# Patient Record
Sex: MALE | Race: WHITE | NOT HISPANIC OR LATINO
[De-identification: names, ages, dates, MRNs, and addresses within clinical notes are randomized per-mention and may not be internally consistent; named-entity substitution may affect disease eponyms.]

---

## 2017-04-17 ENCOUNTER — RX RENEWAL (OUTPATIENT)
Age: 57
End: 2017-04-17

## 2017-06-26 ENCOUNTER — RX RENEWAL (OUTPATIENT)
Age: 57
End: 2017-06-26

## 2017-08-07 ENCOUNTER — RX RENEWAL (OUTPATIENT)
Age: 57
End: 2017-08-07

## 2017-11-01 ENCOUNTER — RX RENEWAL (OUTPATIENT)
Age: 57
End: 2017-11-01

## 2018-01-31 ENCOUNTER — RX RENEWAL (OUTPATIENT)
Age: 58
End: 2018-01-31

## 2018-06-10 ENCOUNTER — RX RENEWAL (OUTPATIENT)
Age: 58
End: 2018-06-10

## 2018-09-07 ENCOUNTER — RX RENEWAL (OUTPATIENT)
Age: 58
End: 2018-09-07

## 2018-12-09 ENCOUNTER — RX RENEWAL (OUTPATIENT)
Age: 58
End: 2018-12-09

## 2019-01-05 ENCOUNTER — APPOINTMENT (OUTPATIENT)
Dept: ULTRASOUND IMAGING | Facility: HOSPITAL | Age: 59
End: 2019-01-05
Payer: COMMERCIAL

## 2019-01-05 ENCOUNTER — OUTPATIENT (OUTPATIENT)
Dept: OUTPATIENT SERVICES | Facility: HOSPITAL | Age: 59
LOS: 1 days | End: 2019-01-05
Payer: COMMERCIAL

## 2019-01-05 DIAGNOSIS — Z00.8 ENCOUNTER FOR OTHER GENERAL EXAMINATION: ICD-10-CM

## 2019-01-05 PROCEDURE — 76700 US EXAM ABDOM COMPLETE: CPT | Mod: 26

## 2019-01-05 PROCEDURE — 76700 US EXAM ABDOM COMPLETE: CPT

## 2019-03-10 ENCOUNTER — RX RENEWAL (OUTPATIENT)
Age: 59
End: 2019-03-10

## 2019-06-10 ENCOUNTER — RX RENEWAL (OUTPATIENT)
Age: 59
End: 2019-06-10

## 2019-09-13 ENCOUNTER — RX RENEWAL (OUTPATIENT)
Age: 59
End: 2019-09-13

## 2019-11-16 ENCOUNTER — EMERGENCY (EMERGENCY)
Facility: HOSPITAL | Age: 59
LOS: 1 days | Discharge: HOME | End: 2019-11-16
Attending: EMERGENCY MEDICINE | Admitting: EMERGENCY MEDICINE
Payer: COMMERCIAL

## 2019-11-16 VITALS — SYSTOLIC BLOOD PRESSURE: 158 MMHG | DIASTOLIC BLOOD PRESSURE: 94 MMHG | HEART RATE: 66 BPM

## 2019-11-16 VITALS
SYSTOLIC BLOOD PRESSURE: 182 MMHG | DIASTOLIC BLOOD PRESSURE: 119 MMHG | RESPIRATION RATE: 20 BRPM | HEIGHT: 69 IN | HEART RATE: 87 BPM | TEMPERATURE: 98 F | OXYGEN SATURATION: 98 % | WEIGHT: 171.96 LBS

## 2019-11-16 DIAGNOSIS — R00.2 PALPITATIONS: ICD-10-CM

## 2019-11-16 LAB
ANION GAP SERPL CALC-SCNC: 8 MMOL/L — SIGNIFICANT CHANGE UP (ref 5–17)
BUN SERPL-MCNC: 22 MG/DL — SIGNIFICANT CHANGE UP (ref 7–23)
CALCIUM SERPL-MCNC: 8.6 MG/DL — SIGNIFICANT CHANGE UP (ref 8.4–10.5)
CHLORIDE SERPL-SCNC: 103 MMOL/L — SIGNIFICANT CHANGE UP (ref 96–108)
CO2 SERPL-SCNC: 29 MMOL/L — SIGNIFICANT CHANGE UP (ref 22–31)
CREAT SERPL-MCNC: 1.29 MG/DL — SIGNIFICANT CHANGE UP (ref 0.5–1.3)
GLUCOSE SERPL-MCNC: 103 MG/DL — HIGH (ref 70–99)
HCT VFR BLD CALC: 45 % — SIGNIFICANT CHANGE UP (ref 39–50)
HGB BLD-MCNC: 15.2 G/DL — SIGNIFICANT CHANGE UP (ref 13–17)
MAGNESIUM SERPL-MCNC: 1.9 MG/DL — SIGNIFICANT CHANGE UP (ref 1.6–2.6)
MCHC RBC-ENTMCNC: 28.1 PG — SIGNIFICANT CHANGE UP (ref 27–34)
MCHC RBC-ENTMCNC: 33.8 GM/DL — SIGNIFICANT CHANGE UP (ref 32–36)
MCV RBC AUTO: 83.3 FL — SIGNIFICANT CHANGE UP (ref 80–100)
NRBC # BLD: 0 /100 WBCS — SIGNIFICANT CHANGE UP (ref 0–0)
PLATELET # BLD AUTO: 179 K/UL — SIGNIFICANT CHANGE UP (ref 150–400)
POTASSIUM SERPL-MCNC: 3.4 MMOL/L — LOW (ref 3.5–5.3)
POTASSIUM SERPL-SCNC: 3.4 MMOL/L — LOW (ref 3.5–5.3)
RBC # BLD: 5.4 M/UL — SIGNIFICANT CHANGE UP (ref 4.2–5.8)
RBC # FLD: 13.3 % — SIGNIFICANT CHANGE UP (ref 10.3–14.5)
SODIUM SERPL-SCNC: 140 MMOL/L — SIGNIFICANT CHANGE UP (ref 135–145)
WBC # BLD: 6.12 K/UL — SIGNIFICANT CHANGE UP (ref 3.8–10.5)
WBC # FLD AUTO: 6.12 K/UL — SIGNIFICANT CHANGE UP (ref 3.8–10.5)

## 2019-11-16 PROCEDURE — 99284 EMERGENCY DEPT VISIT MOD MDM: CPT

## 2019-11-16 PROCEDURE — 99284 EMERGENCY DEPT VISIT MOD MDM: CPT | Mod: 25

## 2019-11-16 PROCEDURE — 80048 BASIC METABOLIC PNL TOTAL CA: CPT

## 2019-11-16 PROCEDURE — 96374 THER/PROPH/DIAG INJ IV PUSH: CPT

## 2019-11-16 PROCEDURE — 83735 ASSAY OF MAGNESIUM: CPT

## 2019-11-16 PROCEDURE — 85027 COMPLETE CBC AUTOMATED: CPT

## 2019-11-16 PROCEDURE — 36415 COLL VENOUS BLD VENIPUNCTURE: CPT

## 2019-11-16 PROCEDURE — 99053 MED SERV 10PM-8AM 24 HR FAC: CPT

## 2019-11-16 PROCEDURE — 93005 ELECTROCARDIOGRAM TRACING: CPT

## 2019-11-16 PROCEDURE — 93010 ELECTROCARDIOGRAM REPORT: CPT

## 2019-11-16 RX ORDER — POTASSIUM CHLORIDE 20 MEQ
40 PACKET (EA) ORAL ONCE
Refills: 0 | Status: COMPLETED | OUTPATIENT
Start: 2019-11-16 | End: 2019-11-16

## 2019-11-16 RX ORDER — METOPROLOL TARTRATE 50 MG
5 TABLET ORAL ONCE
Refills: 0 | Status: COMPLETED | OUTPATIENT
Start: 2019-11-16 | End: 2019-11-16

## 2019-11-16 RX ADMIN — Medication 5 MILLIGRAM(S): at 01:49

## 2019-11-16 RX ADMIN — Medication 40 MILLIEQUIVALENT(S): at 02:56

## 2019-11-16 NOTE — ED PROVIDER NOTE - NSFOLLOWUPINSTRUCTIONS_ED_ALL_ED_FT
Premature Ventricular Contractions    WHAT YOU NEED TO KNOW:    Premature ventricular contractions (PVCs) are an interruption in your heart rhythm. They are caused by an early signal for your heart to pump. Your risk of PVCs increases when you drink alcohol or caffeine, or if you are fatigued or stressed. It is very important for you to follow up with your healthcare provider so the cause of your PVCs can be diagnosed and treated.    DISCHARGE INSTRUCTIONS:    Call 911 if:     You have any of the following signs of a heart attack:   Squeezing, pressure, or pain in your chest      You may also have any of the following:   Discomfort or pain in your back, neck, jaw, stomach, or arm      Shortness of breath      Nausea or vomiting      Lightheadedness or a sudden cold sweat        Contact your healthcare provider if:     You still have symptoms after treatment, or your symptoms worsen.      You have questions or concerns about your condition or care.    Medicines:     Heart medicine may be given to make your heart beat at a regular rate and rhythm.      Take your medicine as directed. Contact your healthcare provider if you think your medicine is not helping or if you have side effects. Tell him or her if you are allergic to any medicine. Keep a list of the medicines, vitamins, and herbs you take. Include the amounts, and when and why you take them. Bring the list or the pill bottles to follow-up visits. Carry your medicine list with you in case of an emergency.    Follow up with your healthcare provider as directed: You may need another EKG within the first 10 days and more testing for up to 12 months. Write down your questions so you remember to ask them during your visits.    Hypokalemia    WHAT YOU NEED TO KNOW:    Hypokalemia is a low level of potassium in your blood. Potassium helps control how your muscles, heart, and digestive system work. Hypokalemia occurs when your body loses too much potassium or does not absorb enough from food.     DISCHARGE INSTRUCTIONS:    Return to the emergency department if:     You cannot move your arm or leg.      You have a fast or irregular heartbeat.      You are too tired or weak to stand up.    Contact your healthcare provider if:     You are vomiting, or you have diarrhea.      You have numbness or tingling in your arms or legs.      Your symptoms do not go away or they get worse.      You have questions or concerns about your condition or care.    Medicines:     Potassium will be given to bring your potassium levels back to normal.      Take your medicine as directed. Contact your healthcare provider if you think your medicine is not helping or if you have side effects. Tell him of her if you are allergic to any medicine. Keep a list of the medicines, vitamins, and herbs you take. Include the amounts, and when and why you take them. Bring the list or the pill bottles to follow-up visits. Carry your medicine list with you in case of an emergency.    Eat foods that are high in potassium: Foods that are high in potassium include bananas, oranges, tomatoes, potatoes, and avocado. Collazo beans, turkey, salmon, lean beef, yogurt, and milk are also high in potassium. Ask your healthcare provider or dietitian for more information about foods that are high in potassium.     Follow up with your healthcare provider as directed: Write down your questions so you remember to ask them during your visits.

## 2019-11-16 NOTE — ED PROVIDER NOTE - CLINICAL SUMMARY MEDICAL DECISION MAKING FREE TEXT BOX
palpitation. likely PVC.  Pt felt palpitation during exam, which correlated with PVC seen on monitor.   no chest pain or other sxs suggestive of acs.  BP at triage high.  will recheck, likely temporary/ anxiety related. r/o arrrhymia, electrolyte abnormality, anemia. check labs, tele monitor in ED. repeat ekg. palpitation. likely PVC.  Pt felt palpitation during exam, which correlated with PVC seen on monitor.   no chest pain or other sxs suggestive of acs.  BP at triage high.  will recheck, likely temporary/ anxiety related. r/o arrrhymia, electrolyte abnormality, anemia. check labs, tele monitor in ED. repeat ekg.    -repeat ekg no pvc, otherwise no change. labs normal except hypokalemia 3.4, which was supplemented PO. lopressor 5mg iv given for elevated bp and pvc . bp improved. pt feeling well. f/u c cardiologist

## 2019-11-16 NOTE — ED PROVIDER NOTE - OBJECTIVE STATEMENT
pt c/o palpitations , moderate, intermittent since 11am today. pt feels extra beat , lasts for a second. no assoc sxs. no chest pain, sob, dizziness, n/v. no fever. no recent illness. no illegal drug use.

## 2019-11-16 NOTE — ED PROVIDER NOTE - PATIENT PORTAL LINK FT
You can access the FollowMyHealth Patient Portal offered by [facility] by registering at the following website: [email or web address]. By joining [employer]’s FollowMyHealth portal, you will also be able to view your health information using other applications (apps) compatible with our system.

## 2019-11-16 NOTE — ED PROVIDER NOTE - CARE PROVIDER_API CALL
Sha Alvarez)  [facility]iology  [address], [address]  [place] [address]  Phone: [phone]  Fax: [phone]  Follow Up Time: 1-3 Days

## 2019-11-18 ENCOUNTER — APPOINTMENT (OUTPATIENT)
Dept: CARDIOLOGY | Facility: CLINIC | Age: 59
End: 2019-11-18
Payer: COMMERCIAL

## 2019-11-18 VITALS
SYSTOLIC BLOOD PRESSURE: 153 MMHG | BODY MASS INDEX: 25.18 KG/M2 | OXYGEN SATURATION: 98 % | WEIGHT: 170 LBS | DIASTOLIC BLOOD PRESSURE: 106 MMHG | HEIGHT: 69 IN | RESPIRATION RATE: 17 BRPM | HEART RATE: 90 BPM

## 2019-11-18 VITALS — DIASTOLIC BLOOD PRESSURE: 98 MMHG | SYSTOLIC BLOOD PRESSURE: 150 MMHG

## 2019-11-18 DIAGNOSIS — I49.3 VENTRICULAR PREMATURE DEPOLARIZATION: ICD-10-CM

## 2019-11-18 DIAGNOSIS — Z00.00 ENCOUNTER FOR GENERAL ADULT MEDICAL EXAMINATION W/OUT ABNORMAL FINDINGS: ICD-10-CM

## 2019-11-18 DIAGNOSIS — R00.2 PALPITATIONS: ICD-10-CM

## 2019-11-18 PROBLEM — I10 ESSENTIAL (PRIMARY) HYPERTENSION: Chronic | Status: ACTIVE | Noted: 2019-11-16

## 2019-11-18 PROCEDURE — 93000 ELECTROCARDIOGRAM COMPLETE: CPT

## 2019-11-18 PROCEDURE — 99214 OFFICE O/P EST MOD 30 MIN: CPT

## 2019-11-18 NOTE — DISCUSSION/SUMMARY
[Responding to Treatment] : responding to treatment [Outpatient Evaluation] : outpatient evaluation [Ambulatory BP Monitoring] : ambulatory blood pressure monitoring [Basic Metabolic Panel] : basic metabolic panel [Echocardiogram] : echocardiogram [Stress Testing] : stress testing [Sodium Restriction] : sodium restriction [PVCs] : ectopic ventricular beats [Stress Test Treadmill] : an exercise treadmill test [de-identified] : white coat syndrome- his machine correlates in office, BP better at home [Medication Changes Per Orders] : as documented in orders [de-identified] : ya 50/12.5 bid

## 2019-11-18 NOTE — REVIEW OF SYSTEMS
[Chest Pain] : chest pain [Numbness (Hypesthesia)] : numbness [Negative] : Heme/Lymph [Tingling (Paresthesia)] : no tingling

## 2019-11-18 NOTE — PHYSICAL EXAM
[General Appearance - Well Developed] : well developed [Normal Appearance] : normal appearance [General Appearance - Well Nourished] : well nourished [Well Groomed] : well groomed [No Deformities] : no deformities [Normal Conjunctiva] : the conjunctiva exhibited no abnormalities [General Appearance - In No Acute Distress] : no acute distress [Normal Oral Mucosa] : normal oral mucosa [Eyelids - No Xanthelasma] : the eyelids demonstrated no xanthelasmas [No Oral Pallor] : no oral pallor [No Oral Cyanosis] : no oral cyanosis [Normal Jugular Venous A Waves Present] : normal jugular venous A waves present [No Jugular Venous Galvan A Waves] : no jugular venous galvan A waves [Normal Jugular Venous V Waves Present] : normal jugular venous V waves present [Respiration, Rhythm And Depth] : normal respiratory rhythm and effort [Exaggerated Use Of Accessory Muscles For Inspiration] : no accessory muscle use [Auscultation Breath Sounds / Voice Sounds] : lungs were clear to auscultation bilaterally [Abdomen Soft] : soft [Abdomen Tenderness] : non-tender [Abdomen Mass (___ Cm)] : no abdominal mass palpated [Abnormal Walk] : normal gait [Nail Clubbing] : no clubbing of the fingernails [Gait - Sufficient For Exercise Testing] : the gait was sufficient for exercise testing [Cyanosis, Localized] : no localized cyanosis [Petechial Hemorrhages (___cm)] : no petechial hemorrhages [Skin Color & Pigmentation] : normal skin color and pigmentation [] : no rash [Skin Lesions] : no skin lesions [No Venous Stasis] : no venous stasis [No Skin Ulcers] : no skin ulcer [No Xanthoma] : no  xanthoma was observed [Oriented To Time, Place, And Person] : oriented to person, place, and time [Mood] : the mood was normal [Affect] : the affect was normal [No Anxiety] : not feeling anxious [Normal Rate] : normal [Normal S1] : normal S1 [Normal S2] : normal S2 [No Murmur] : no murmurs heard [2+] : right 2+ [No Abnormalities] : the abdominal aorta was not enlarged and no bruit was heard [No Pitting Edema] : no pitting edema present [S3] : no S3 [Right Carotid Bruit] : no bruit heard over the right carotid [S4] : no S4 [Right Femoral Bruit] : no bruit heard over the right femoral artery [Left Femoral Bruit] : no bruit heard over the left femoral artery [Left Carotid Bruit] : no bruit heard over the left carotid

## 2019-11-18 NOTE — REASON FOR VISIT
[Follow-Up - Clinic] : a clinic follow-up of [Abnormal ECG] : an abnormal ECG [Hypertension] : hypertension [FreeTextEntry2] : last seen 2016, s/p recent hosp for palps,pvcs,htn [FreeTextEntry1] : no sob or sscp, no more [employer]

## 2019-11-27 ENCOUNTER — NON-APPOINTMENT (OUTPATIENT)
Age: 59
End: 2019-11-27

## 2019-11-27 PROCEDURE — 93224 XTRNL ECG REC UP TO 48 HRS: CPT

## 2019-12-11 ENCOUNTER — APPOINTMENT (OUTPATIENT)
Dept: CARDIOLOGY | Facility: CLINIC | Age: 59
End: 2019-12-11
Payer: COMMERCIAL

## 2019-12-11 PROCEDURE — 93015 CV STRESS TEST SUPVJ I&R: CPT

## 2019-12-11 PROCEDURE — 93306 TTE W/DOPPLER COMPLETE: CPT

## 2019-12-12 ENCOUNTER — RX RENEWAL (OUTPATIENT)
Age: 59
End: 2019-12-12

## 2020-04-13 ENCOUNTER — APPOINTMENT (OUTPATIENT)
Dept: CARDIOLOGY | Facility: CLINIC | Age: 60
End: 2020-04-13
Payer: COMMERCIAL

## 2020-04-13 PROCEDURE — 99442: CPT

## 2020-04-17 ENCOUNTER — EMERGENCY (EMERGENCY)
Facility: HOSPITAL | Age: 60
LOS: 1 days | Discharge: HOME | End: 2020-04-17
Attending: EMERGENCY MEDICINE | Admitting: EMERGENCY MEDICINE
Payer: COMMERCIAL

## 2020-04-17 VITALS
DIASTOLIC BLOOD PRESSURE: 81 MMHG | SYSTOLIC BLOOD PRESSURE: 120 MMHG | TEMPERATURE: 101 F | OXYGEN SATURATION: 97 % | RESPIRATION RATE: 18 BRPM | HEIGHT: 69 IN | HEART RATE: 88 BPM | WEIGHT: 169.98 LBS

## 2020-04-17 VITALS
SYSTOLIC BLOOD PRESSURE: 113 MMHG | OXYGEN SATURATION: 98 % | TEMPERATURE: 98 F | HEART RATE: 71 BPM | RESPIRATION RATE: 18 BRPM | DIASTOLIC BLOOD PRESSURE: 84 MMHG

## 2020-04-17 LAB
ANION GAP SERPL CALC-SCNC: 7 MMOL/L — SIGNIFICANT CHANGE UP (ref 5–17)
APPEARANCE UR: CLEAR — SIGNIFICANT CHANGE UP
BACTERIA # UR AUTO: ABNORMAL /HPF
BASOPHILS # BLD AUTO: 0.01 K/UL — SIGNIFICANT CHANGE UP (ref 0–0.2)
BASOPHILS NFR BLD AUTO: 0.2 % — SIGNIFICANT CHANGE UP (ref 0–2)
BILIRUB UR-MCNC: NEGATIVE — SIGNIFICANT CHANGE UP
BUN SERPL-MCNC: 28 MG/DL — HIGH (ref 7–23)
CALCIUM SERPL-MCNC: 8.7 MG/DL — SIGNIFICANT CHANGE UP (ref 8.4–10.5)
CHLORIDE SERPL-SCNC: 100 MMOL/L — SIGNIFICANT CHANGE UP (ref 96–108)
CO2 SERPL-SCNC: 30 MMOL/L — SIGNIFICANT CHANGE UP (ref 22–31)
COLOR SPEC: YELLOW — SIGNIFICANT CHANGE UP
CREAT SERPL-MCNC: 1.71 MG/DL — HIGH (ref 0.5–1.3)
DIFF PNL FLD: ABNORMAL
EOSINOPHIL # BLD AUTO: 0 K/UL — SIGNIFICANT CHANGE UP (ref 0–0.5)
EOSINOPHIL NFR BLD AUTO: 0 % — SIGNIFICANT CHANGE UP (ref 0–6)
EPI CELLS # UR: SIGNIFICANT CHANGE UP
GLUCOSE SERPL-MCNC: 97 MG/DL — SIGNIFICANT CHANGE UP (ref 70–99)
GLUCOSE UR QL: NEGATIVE — SIGNIFICANT CHANGE UP
HCT VFR BLD CALC: 43.8 % — SIGNIFICANT CHANGE UP (ref 39–50)
HGB BLD-MCNC: 14.4 G/DL — SIGNIFICANT CHANGE UP (ref 13–17)
IMM GRANULOCYTES NFR BLD AUTO: 0.3 % — SIGNIFICANT CHANGE UP (ref 0–1.5)
KETONES UR-MCNC: ABNORMAL
LEUKOCYTE ESTERASE UR-ACNC: NEGATIVE — SIGNIFICANT CHANGE UP
LYMPHOCYTES # BLD AUTO: 0.66 K/UL — LOW (ref 1–3.3)
LYMPHOCYTES # BLD AUTO: 11 % — LOW (ref 13–44)
MCHC RBC-ENTMCNC: 28.2 PG — SIGNIFICANT CHANGE UP (ref 27–34)
MCHC RBC-ENTMCNC: 32.9 GM/DL — SIGNIFICANT CHANGE UP (ref 32–36)
MCV RBC AUTO: 85.9 FL — SIGNIFICANT CHANGE UP (ref 80–100)
MONOCYTES # BLD AUTO: 0.49 K/UL — SIGNIFICANT CHANGE UP (ref 0–0.9)
MONOCYTES NFR BLD AUTO: 8.2 % — SIGNIFICANT CHANGE UP (ref 2–14)
NEUTROPHILS # BLD AUTO: 4.81 K/UL — SIGNIFICANT CHANGE UP (ref 1.8–7.4)
NEUTROPHILS NFR BLD AUTO: 80.3 % — HIGH (ref 43–77)
NITRITE UR-MCNC: POSITIVE
NRBC # BLD: 0 /100 WBCS — SIGNIFICANT CHANGE UP (ref 0–0)
PH UR: 5 — SIGNIFICANT CHANGE UP (ref 5–8)
PLATELET # BLD AUTO: 123 K/UL — LOW (ref 150–400)
POTASSIUM SERPL-MCNC: 4.4 MMOL/L — SIGNIFICANT CHANGE UP (ref 3.5–5.3)
POTASSIUM SERPL-SCNC: 4.4 MMOL/L — SIGNIFICANT CHANGE UP (ref 3.5–5.3)
PROT UR-MCNC: 100
RBC # BLD: 5.1 M/UL — SIGNIFICANT CHANGE UP (ref 4.2–5.8)
RBC # FLD: 12.9 % — SIGNIFICANT CHANGE UP (ref 10.3–14.5)
RBC CASTS # UR COMP ASSIST: SIGNIFICANT CHANGE UP /HPF (ref 0–4)
SARS-COV-2 RNA SPEC QL NAA+PROBE: DETECTED
SODIUM SERPL-SCNC: 137 MMOL/L — SIGNIFICANT CHANGE UP (ref 135–145)
SP GR SPEC: 1.02 — SIGNIFICANT CHANGE UP (ref 1.01–1.02)
UROBILINOGEN FLD QL: NEGATIVE — SIGNIFICANT CHANGE UP
WBC # BLD: 5.99 K/UL — SIGNIFICANT CHANGE UP (ref 3.8–10.5)
WBC # FLD AUTO: 5.99 K/UL — SIGNIFICANT CHANGE UP (ref 3.8–10.5)
WBC UR QL: NEGATIVE /HPF — SIGNIFICANT CHANGE UP (ref 0–5)

## 2020-04-17 PROCEDURE — 87086 URINE CULTURE/COLONY COUNT: CPT

## 2020-04-17 PROCEDURE — 87635 SARS-COV-2 COVID-19 AMP PRB: CPT

## 2020-04-17 PROCEDURE — 96360 HYDRATION IV INFUSION INIT: CPT

## 2020-04-17 PROCEDURE — 76870 US EXAM SCROTUM: CPT | Mod: 26

## 2020-04-17 PROCEDURE — 99284 EMERGENCY DEPT VISIT MOD MDM: CPT

## 2020-04-17 PROCEDURE — 85027 COMPLETE CBC AUTOMATED: CPT

## 2020-04-17 PROCEDURE — 99284 EMERGENCY DEPT VISIT MOD MDM: CPT | Mod: 25

## 2020-04-17 PROCEDURE — 81001 URINALYSIS AUTO W/SCOPE: CPT

## 2020-04-17 PROCEDURE — 80048 BASIC METABOLIC PNL TOTAL CA: CPT

## 2020-04-17 PROCEDURE — 99053 MED SERV 10PM-8AM 24 HR FAC: CPT

## 2020-04-17 PROCEDURE — 76870 US EXAM SCROTUM: CPT

## 2020-04-17 RX ORDER — AZTREONAM 2 G
1 VIAL (EA) INJECTION
Qty: 14 | Refills: 0
Start: 2020-04-17 | End: 2020-04-23

## 2020-04-17 RX ORDER — SODIUM CHLORIDE 9 MG/ML
1000 INJECTION INTRAMUSCULAR; INTRAVENOUS; SUBCUTANEOUS ONCE
Refills: 0 | Status: COMPLETED | OUTPATIENT
Start: 2020-04-17 | End: 2020-04-17

## 2020-04-17 RX ORDER — ACETAMINOPHEN 500 MG
650 TABLET ORAL ONCE
Refills: 0 | Status: COMPLETED | OUTPATIENT
Start: 2020-04-17 | End: 2020-04-17

## 2020-04-17 RX ADMIN — Medication 650 MILLIGRAM(S): at 06:00

## 2020-04-17 RX ADMIN — SODIUM CHLORIDE 1000 MILLILITER(S): 9 INJECTION INTRAMUSCULAR; INTRAVENOUS; SUBCUTANEOUS at 08:45

## 2020-04-17 RX ADMIN — SODIUM CHLORIDE 2000 MILLILITER(S): 9 INJECTION INTRAMUSCULAR; INTRAVENOUS; SUBCUTANEOUS at 07:45

## 2020-04-17 NOTE — ED PROVIDER NOTE - CARE PLAN
Principal Discharge DX:	Testicular pain Principal Discharge DX:	Testicular pain  Secondary Diagnosis:	Renal insufficiency

## 2020-04-17 NOTE — ED ADULT TRIAGE NOTE - CHIEF COMPLAINT QUOTE
Pt c/o swollen testicles since yesterday. Pt also has fever and cough x 10 days.  Pt finished z pack 3 days ago.

## 2020-04-17 NOTE — ED PROVIDER NOTE - NSFOLLOWUPINSTRUCTIONS_ED_ALL_ED_FT
URINARY TRACT INFECTION IN MEN - AfterCare(R) Instructions(ER/ED)     Urinary Tract Infection in Men    WHAT YOU NEED TO KNOW:    A urinary tract infection (UTI) is caused by bacteria that get inside your urinary tract. Most bacteria that enter your urinary tract come out when you urinate. If the bacteria stay in your urinary tract, you may get an infection. Your urinary tract includes your kidneys, ureters, bladder, and urethra. Urine is made in your kidneys, and it flows from the ureters to the bladder. Urine leaves the bladder through the urethra. A UTI is more common in your lower urinary tract, which includes your bladder and urethra.          DISCHARGE INSTRUCTIONS:    Return to the emergency department if:     You are urinating very little or not at all.      You have a high fever with shaking chills.       You have side or back pain that gets worse.    Contact your healthcare provider if:     You have a mild fever.      You do not feel better after 2 days of taking antibiotics.      You are vomiting.       You have new symptoms, such as blood or pus in your urine.       You have questions or concerns about your condition or care.    Medicines:     Antibiotics help fight a bacterial infection.       Medicines may be given to decrease pain and burning when you urinate. They will also help decrease the feeling that you need to urinate often. These medicines will make your urine orange or red.      Take your medicine as directed. Contact your healthcare provider if you think your medicine is not helping or if you have side effects. Tell him of her if you are allergic to any medicine. Keep a list of the medicines, vitamins, and herbs you take. Include the amounts, and when and why you take them. Bring the list or the pill bottles to follow-up visits. Carry your medicine list with you in case of an emergency.    Prevent another UTI:     Empty your bladder often. Urinate and empty your bladder as soon as you feel the need. Do not hold your urine for long periods of time.      Drink liquids as directed. Ask how much liquid to drink each day and which liquids are best for you. You may need to drink more liquids than usual to help flush out the bacteria. Do not drink alcohol, caffeine, or citrus juices. These can irritate your bladder and increase your symptoms. Your healthcare provider may recommend cranberry juice to help prevent a UTI.      Urinate after you have sex. This can help flush out bacteria passed during sex.      Do pelvic muscle exercises often. Pelvic muscle exercises may help you start and stop urinating. Strong pelvic muscles may help you empty your bladder easier. Squeeze these muscles tightly for 5 seconds like you are trying to hold back urine. Then relax for 5 seconds. Gradually work up to squeezing for 10 seconds. Do 3 sets of 15 repetitions a day, or as directed.    Follow up with your healthcare provider as directed: Write down your questions so you remember to ask them during your visits.        © Copyright "[employer]" 2020       back to top                      © Copyright "[employer]" 2020

## 2020-04-17 NOTE — ED PROVIDER NOTE - OBJECTIVE STATEMENT
58 y/o male with h/o HTN presented to Ed c/o b/l testicular swelling noticed yesterday. no pain., no F/U/D , no penile discharge. pt had cough and fever for 10 days, never get tested for COVID, but denies any SOB.

## 2020-04-17 NOTE — ED PROVIDER NOTE - PHYSICAL EXAMINATION
General:     NAD, well-nourished, well-appearing  Head:     NC/AT, EOMI, oral mucosa moist  Neck:     supple  Lungs:     CTA b/l, no w/r/r  CVS:     S1S2, RRR, no m/g/r  Abd:     +BS, s/nt/nd, no organomegaly  Ext:    2+ radial and pedal pulses, no c/c/e  Neuro: grossly intact  Genitals: uncircumcised phallus , no lesion, no discharge, scrotum minimal erythema, no tenderness, mild edema of spermatic cord on right side

## 2020-04-17 NOTE — ED PROVIDER NOTE - PATIENT PORTAL LINK FT
You can access the FollowMyHealth Patient Portal offered by [facility] by registering at the following website: [email or web address]. By joining [employer]’s FollowMyHealth portal, you will also be able to view your health information using other applications (apps) compatible with our system.

## 2020-04-17 NOTE — ED ADULT NURSE NOTE - OBJECTIVE STATEMENT
Pt presents to the ED with c/o swollen testicles since yesterday. Pt denies any pain. Pt also has cough and fever for 10 days.

## 2020-04-17 NOTE — ED ADULT NURSE NOTE - CAS ELECT INFOMATION PROVIDED
Called pharmacy and they said they have no idea what she is requesting. Pt told them she will bring a picture for them later on. I also called patient and left her a voicemail    DC instructions

## 2020-04-17 NOTE — ED PROVIDER NOTE - CLINICAL SUMMARY MEDICAL DECISION MAKING FREE TEXT BOX
pt p//w testicular selling, with out any pain and associated symptoms, has minimal edema of scrotum, like pt has mild hydrocele or right sided spermatocele. pt p//w testicular selling, with out any pain and associated symptoms, has minimal edema of scrotum, like pt has mild hydrocele or right sided spermatocele. pt was found to have UTI and also tested positive for COVID, pt was signed out to Dr. Colleta at 7 am and told to f/u scrotal US

## 2020-04-17 NOTE — ED PROVIDER NOTE - CARE PROVIDER_API CALL
Noemí Smith)  Family Medicine  [address] [place] [address], [address]  [place] [address]  Phone: [phone]  Fax: [phone]  Follow Up Time:

## 2020-04-18 LAB
CULTURE RESULTS: SIGNIFICANT CHANGE UP
SPECIMEN SOURCE: SIGNIFICANT CHANGE UP

## 2020-05-09 ENCOUNTER — EMERGENCY (EMERGENCY)
Facility: HOSPITAL | Age: 60
LOS: 1 days | Discharge: HOME | End: 2020-05-09
Attending: EMERGENCY MEDICINE | Admitting: EMERGENCY MEDICINE
Payer: COMMERCIAL

## 2020-05-09 VITALS
SYSTOLIC BLOOD PRESSURE: 167 MMHG | TEMPERATURE: 97 F | WEIGHT: 164.91 LBS | HEART RATE: 94 BPM | RESPIRATION RATE: 18 BRPM | OXYGEN SATURATION: 99 % | HEIGHT: 69 IN | DIASTOLIC BLOOD PRESSURE: 110 MMHG

## 2020-05-09 DIAGNOSIS — R10.9 UNSPECIFIED ABDOMINAL PAIN: ICD-10-CM

## 2020-05-09 LAB
ALBUMIN SERPL ELPH-MCNC: 3.7 G/DL — SIGNIFICANT CHANGE UP (ref 3.3–5)
ALP SERPL-CCNC: 46 U/L — SIGNIFICANT CHANGE UP (ref 40–120)
ALT FLD-CCNC: 52 U/L — HIGH (ref 10–45)
ANION GAP SERPL CALC-SCNC: 9 MMOL/L — SIGNIFICANT CHANGE UP (ref 5–17)
APPEARANCE UR: CLEAR — SIGNIFICANT CHANGE UP
AST SERPL-CCNC: 38 U/L — SIGNIFICANT CHANGE UP (ref 10–40)
BACTERIA # UR AUTO: NEGATIVE /HPF — SIGNIFICANT CHANGE UP
BASOPHILS # BLD AUTO: 0.03 K/UL — SIGNIFICANT CHANGE UP (ref 0–0.2)
BASOPHILS NFR BLD AUTO: 0.5 % — SIGNIFICANT CHANGE UP (ref 0–2)
BILIRUB SERPL-MCNC: 0.4 MG/DL — SIGNIFICANT CHANGE UP (ref 0.2–1.2)
BILIRUB UR-MCNC: NEGATIVE — SIGNIFICANT CHANGE UP
BUN SERPL-MCNC: 20 MG/DL — SIGNIFICANT CHANGE UP (ref 7–23)
CALCIUM SERPL-MCNC: 8.6 MG/DL — SIGNIFICANT CHANGE UP (ref 8.4–10.5)
CHLORIDE SERPL-SCNC: 106 MMOL/L — SIGNIFICANT CHANGE UP (ref 96–108)
CO2 SERPL-SCNC: 25 MMOL/L — SIGNIFICANT CHANGE UP (ref 22–31)
COLOR SPEC: YELLOW — SIGNIFICANT CHANGE UP
CREAT SERPL-MCNC: 1.42 MG/DL — HIGH (ref 0.5–1.3)
DIFF PNL FLD: ABNORMAL
EOSINOPHIL # BLD AUTO: 0.16 K/UL — SIGNIFICANT CHANGE UP (ref 0–0.5)
EOSINOPHIL NFR BLD AUTO: 2.6 % — SIGNIFICANT CHANGE UP (ref 0–6)
EPI CELLS # UR: SIGNIFICANT CHANGE UP
GLUCOSE SERPL-MCNC: 97 MG/DL — SIGNIFICANT CHANGE UP (ref 70–99)
GLUCOSE UR QL: NEGATIVE — SIGNIFICANT CHANGE UP
HCT VFR BLD CALC: 39.1 % — SIGNIFICANT CHANGE UP (ref 39–50)
HGB BLD-MCNC: 12.8 G/DL — LOW (ref 13–17)
IMM GRANULOCYTES NFR BLD AUTO: 0.3 % — SIGNIFICANT CHANGE UP (ref 0–1.5)
KETONES UR-MCNC: NEGATIVE — SIGNIFICANT CHANGE UP
LEUKOCYTE ESTERASE UR-ACNC: NEGATIVE — SIGNIFICANT CHANGE UP
LYMPHOCYTES # BLD AUTO: 1.87 K/UL — SIGNIFICANT CHANGE UP (ref 1–3.3)
LYMPHOCYTES # BLD AUTO: 31 % — SIGNIFICANT CHANGE UP (ref 13–44)
MCHC RBC-ENTMCNC: 28.4 PG — SIGNIFICANT CHANGE UP (ref 27–34)
MCHC RBC-ENTMCNC: 32.7 GM/DL — SIGNIFICANT CHANGE UP (ref 32–36)
MCV RBC AUTO: 86.7 FL — SIGNIFICANT CHANGE UP (ref 80–100)
MONOCYTES # BLD AUTO: 0.54 K/UL — SIGNIFICANT CHANGE UP (ref 0–0.9)
MONOCYTES NFR BLD AUTO: 8.9 % — SIGNIFICANT CHANGE UP (ref 2–14)
NEUTROPHILS # BLD AUTO: 3.42 K/UL — SIGNIFICANT CHANGE UP (ref 1.8–7.4)
NEUTROPHILS NFR BLD AUTO: 56.7 % — SIGNIFICANT CHANGE UP (ref 43–77)
NITRITE UR-MCNC: NEGATIVE — SIGNIFICANT CHANGE UP
NRBC # BLD: 0 /100 WBCS — SIGNIFICANT CHANGE UP (ref 0–0)
PH UR: 7 — SIGNIFICANT CHANGE UP (ref 5–8)
PLATELET # BLD AUTO: 190 K/UL — SIGNIFICANT CHANGE UP (ref 150–400)
POTASSIUM SERPL-MCNC: 3.7 MMOL/L — SIGNIFICANT CHANGE UP (ref 3.5–5.3)
POTASSIUM SERPL-SCNC: 3.7 MMOL/L — SIGNIFICANT CHANGE UP (ref 3.5–5.3)
PROT SERPL-MCNC: 7.3 G/DL — SIGNIFICANT CHANGE UP (ref 6–8.3)
PROT UR-MCNC: NEGATIVE — SIGNIFICANT CHANGE UP
RBC # BLD: 4.51 M/UL — SIGNIFICANT CHANGE UP (ref 4.2–5.8)
RBC # FLD: 13.5 % — SIGNIFICANT CHANGE UP (ref 10.3–14.5)
RBC CASTS # UR COMP ASSIST: SIGNIFICANT CHANGE UP /HPF (ref 0–4)
SODIUM SERPL-SCNC: 140 MMOL/L — SIGNIFICANT CHANGE UP (ref 135–145)
SP GR SPEC: 1.01 — SIGNIFICANT CHANGE UP (ref 1.01–1.02)
UROBILINOGEN FLD QL: NEGATIVE — SIGNIFICANT CHANGE UP
WBC # BLD: 6.04 K/UL — SIGNIFICANT CHANGE UP (ref 3.8–10.5)
WBC # FLD AUTO: 6.04 K/UL — SIGNIFICANT CHANGE UP (ref 3.8–10.5)
WBC UR QL: NEGATIVE /HPF — SIGNIFICANT CHANGE UP (ref 0–5)

## 2020-05-09 PROCEDURE — 80053 COMPREHEN METABOLIC PANEL: CPT

## 2020-05-09 PROCEDURE — 85027 COMPLETE CBC AUTOMATED: CPT

## 2020-05-09 PROCEDURE — 74176 CT ABD & PELVIS W/O CONTRAST: CPT

## 2020-05-09 PROCEDURE — 96374 THER/PROPH/DIAG INJ IV PUSH: CPT

## 2020-05-09 PROCEDURE — 74176 CT ABD & PELVIS W/O CONTRAST: CPT | Mod: 26

## 2020-05-09 PROCEDURE — 99053 MED SERV 10PM-8AM 24 HR FAC: CPT

## 2020-05-09 PROCEDURE — 96361 HYDRATE IV INFUSION ADD-ON: CPT

## 2020-05-09 PROCEDURE — 96372 THER/PROPH/DIAG INJ SC/IM: CPT | Mod: XU

## 2020-05-09 PROCEDURE — 99284 EMERGENCY DEPT VISIT MOD MDM: CPT | Mod: 25

## 2020-05-09 PROCEDURE — 99285 EMERGENCY DEPT VISIT HI MDM: CPT

## 2020-05-09 PROCEDURE — 36415 COLL VENOUS BLD VENIPUNCTURE: CPT

## 2020-05-09 PROCEDURE — 81001 URINALYSIS AUTO W/SCOPE: CPT

## 2020-05-09 RX ORDER — SODIUM CHLORIDE 9 MG/ML
1000 INJECTION INTRAMUSCULAR; INTRAVENOUS; SUBCUTANEOUS ONCE
Refills: 0 | Status: COMPLETED | OUTPATIENT
Start: 2020-05-09 | End: 2020-05-09

## 2020-05-09 RX ORDER — IBUPROFEN 200 MG
1 TABLET ORAL
Qty: 20 | Refills: 0
Start: 2020-05-09 | End: 2020-05-13

## 2020-05-09 RX ORDER — MULTIVIT WITH MIN/MFOLATE/K2 340-15/3 G
1 POWDER (GRAM) ORAL ONCE
Refills: 0 | Status: COMPLETED | OUTPATIENT
Start: 2020-05-09 | End: 2020-05-09

## 2020-05-09 RX ORDER — TAMSULOSIN HYDROCHLORIDE 0.4 MG/1
1 CAPSULE ORAL
Qty: 10 | Refills: 0
Start: 2020-05-09 | End: 2020-05-18

## 2020-05-09 RX ORDER — TAMSULOSIN HYDROCHLORIDE 0.4 MG/1
0.4 CAPSULE ORAL AT BEDTIME
Refills: 0 | Status: DISCONTINUED | OUTPATIENT
Start: 2020-05-09 | End: 2020-05-13

## 2020-05-09 RX ORDER — KETOROLAC TROMETHAMINE 30 MG/ML
30 SYRINGE (ML) INJECTION ONCE
Refills: 0 | Status: DISCONTINUED | OUTPATIENT
Start: 2020-05-09 | End: 2020-05-09

## 2020-05-09 RX ORDER — ONDANSETRON 8 MG/1
4 TABLET, FILM COATED ORAL ONCE
Refills: 0 | Status: COMPLETED | OUTPATIENT
Start: 2020-05-09 | End: 2020-05-09

## 2020-05-09 RX ADMIN — SODIUM CHLORIDE 2000 MILLILITER(S): 9 INJECTION INTRAMUSCULAR; INTRAVENOUS; SUBCUTANEOUS at 01:44

## 2020-05-09 RX ADMIN — ONDANSETRON 4 MILLIGRAM(S): 8 TABLET, FILM COATED ORAL at 01:44

## 2020-05-09 RX ADMIN — Medication 30 MILLIGRAM(S): at 01:44

## 2020-05-09 RX ADMIN — SODIUM CHLORIDE 1000 MILLILITER(S): 9 INJECTION INTRAMUSCULAR; INTRAVENOUS; SUBCUTANEOUS at 02:40

## 2020-05-09 RX ADMIN — Medication 1 BOTTLE: at 03:01

## 2020-05-09 RX ADMIN — TAMSULOSIN HYDROCHLORIDE 0.4 MILLIGRAM(S): 0.4 CAPSULE ORAL at 03:01

## 2020-05-09 NOTE — ED PROVIDER NOTE - CARE PROVIDER_API CALL
Jc Barnard)  Urology  [address], [address]  [place], [place] [id number]  Phone: [phone]  Fax: [phone]  Follow Up Time:

## 2020-05-09 NOTE — ED PROVIDER NOTE - PHYSICAL EXAMINATION
General:     NAD, well-nourished, well-appearing  Head:     NC/AT, EOMI, oral mucosa moist  Neck:     supple  Lungs:     CTA b/l, no w/r/r  CVS:     S1S2, RRR, no m/g/r  Abd:     +BS, mild right mid flank tenderness, no rebound , no guarding , no organomegaly  Ext:    2+ radial and pedal pulses, no c/c/e  Neuro: grossly intact

## 2020-05-09 NOTE — ED PROVIDER NOTE - CLINICAL SUMMARY MEDICAL DECISION MAKING FREE TEXT BOX
pt with h/o HTN and recent COVID infection p/w right flank pain . no fever, had 2 mm right distal ureteric stone with minimal hydro, pt was given Flmax and ibuprofen for pain, advised to drink lot of water, and follow up with dr Barnard

## 2020-05-09 NOTE — ED ADULT TRIAGE NOTE - CHIEF COMPLAINT QUOTE
Pt presents to ED w/ c/o RLQ abdominal pain since yesterday. Pt has been constipated for 2 weeks, had a small BM yesterday. Pt was covid positive 2 weeks ago, denies any symptoms at this time.

## 2020-05-09 NOTE — ED PROVIDER NOTE - OBJECTIVE STATEMENT
58 y/o male with h/o HTN presents to ed c/o right sided mid abdominal pain started suddenly few hours ago, mild nausea, no fever, No F/U/D. c/o constipation for 2-3 days.

## 2020-05-09 NOTE — ED PROVIDER NOTE - PATIENT PORTAL LINK FT
You can access the FollowMyHealth Patient Portal offered by [facility] by registering at the following website: [email or web address]. By joining [employer]’s FollowMyHealth portal, you will also be able to view your health information using other applications (apps) compatible with our system.

## 2020-07-19 ENCOUNTER — RX RENEWAL (OUTPATIENT)
Age: 60
End: 2020-07-19

## 2021-01-19 ENCOUNTER — RX RENEWAL (OUTPATIENT)
Age: 61
End: 2021-01-19

## 2021-02-24 ENCOUNTER — APPOINTMENT (OUTPATIENT)
Dept: CARDIOLOGY | Facility: CLINIC | Age: 61
End: 2021-02-24

## 2021-10-27 DIAGNOSIS — U07.1 COVID-19: ICD-10-CM

## 2022-09-14 ENCOUNTER — EMERGENCY (EMERGENCY)
Facility: HOSPITAL | Age: 62
LOS: 1 days | Discharge: HOME | End: 2022-09-14
Attending: EMERGENCY MEDICINE | Admitting: HOSPITALIST
Payer: COMMERCIAL

## 2022-09-14 VITALS
WEIGHT: 156.09 LBS | TEMPERATURE: 97 F | HEIGHT: 69 IN | DIASTOLIC BLOOD PRESSURE: 118 MMHG | RESPIRATION RATE: 16 BRPM | OXYGEN SATURATION: 92 % | SYSTOLIC BLOOD PRESSURE: 243 MMHG | HEART RATE: 148 BPM

## 2022-09-14 DIAGNOSIS — Z20.822 CONTACT WITH AND (SUSPECTED) EXPOSURE TO COVID-19: ICD-10-CM

## 2022-09-14 DIAGNOSIS — I49.3 VENTRICULAR PREMATURE DEPOLARIZATION: ICD-10-CM

## 2022-09-14 DIAGNOSIS — R00.0 TACHYCARDIA, UNSPECIFIED: ICD-10-CM

## 2022-09-14 DIAGNOSIS — N18.2 CHRONIC KIDNEY DISEASE, STAGE 2 (MILD): ICD-10-CM

## 2022-09-14 DIAGNOSIS — R00.2 PALPITATIONS: ICD-10-CM

## 2022-09-14 DIAGNOSIS — Z87.891 PERSONAL HISTORY OF NICOTINE DEPENDENCE: ICD-10-CM

## 2022-09-14 DIAGNOSIS — Z82.49 FAMILY HISTORY OF ISCHEMIC HEART DISEASE AND OTHER DISEASES OF THE CIRCULATORY SYSTEM: ICD-10-CM

## 2022-09-14 DIAGNOSIS — I12.9 HYPERTENSIVE CHRONIC KIDNEY DISEASE WITH STAGE 1 THROUGH STAGE 4 CHRONIC KIDNEY DISEASE, OR UNSPECIFIED CHRONIC KIDNEY DISEASE: ICD-10-CM

## 2022-09-14 DIAGNOSIS — Z83.3 FAMILY HISTORY OF DIABETES MELLITUS: ICD-10-CM

## 2022-09-14 DIAGNOSIS — I16.0 HYPERTENSIVE URGENCY: ICD-10-CM

## 2022-09-14 LAB
ALBUMIN SERPL ELPH-MCNC: 4.6 G/DL — SIGNIFICANT CHANGE UP (ref 3.3–5)
ALP SERPL-CCNC: 55 U/L — SIGNIFICANT CHANGE UP (ref 40–120)
ALT FLD-CCNC: 15 U/L — SIGNIFICANT CHANGE UP (ref 10–45)
ANION GAP SERPL CALC-SCNC: 10 MMOL/L — SIGNIFICANT CHANGE UP (ref 5–17)
AST SERPL-CCNC: 20 U/L — SIGNIFICANT CHANGE UP (ref 10–40)
BASOPHILS # BLD AUTO: 0.03 K/UL — SIGNIFICANT CHANGE UP (ref 0–0.2)
BASOPHILS NFR BLD AUTO: 0.4 % — SIGNIFICANT CHANGE UP (ref 0–2)
BILIRUB SERPL-MCNC: 1.3 MG/DL — HIGH (ref 0.2–1.2)
BUN SERPL-MCNC: 19 MG/DL — SIGNIFICANT CHANGE UP (ref 7–23)
CALCIUM SERPL-MCNC: 9.4 MG/DL — SIGNIFICANT CHANGE UP (ref 8.4–10.5)
CHLORIDE SERPL-SCNC: 103 MMOL/L — SIGNIFICANT CHANGE UP (ref 96–108)
CO2 SERPL-SCNC: 28 MMOL/L — SIGNIFICANT CHANGE UP (ref 22–31)
CREAT SERPL-MCNC: 1.33 MG/DL — HIGH (ref 0.5–1.3)
D DIMER BLD IA.RAPID-MCNC: <150 NG/ML DDU — SIGNIFICANT CHANGE UP
EGFR: 61 ML/MIN/1.73M2 — SIGNIFICANT CHANGE UP
EOSINOPHIL # BLD AUTO: 0.08 K/UL — SIGNIFICANT CHANGE UP (ref 0–0.5)
EOSINOPHIL NFR BLD AUTO: 1.2 % — SIGNIFICANT CHANGE UP (ref 0–6)
GLUCOSE SERPL-MCNC: 104 MG/DL — HIGH (ref 70–99)
HCT VFR BLD CALC: 47.6 % — SIGNIFICANT CHANGE UP (ref 39–50)
HGB BLD-MCNC: 16.1 G/DL — SIGNIFICANT CHANGE UP (ref 13–17)
IMM GRANULOCYTES NFR BLD AUTO: 0.1 % — SIGNIFICANT CHANGE UP (ref 0–1.5)
LIDOCAIN IGE QN: 262 U/L — SIGNIFICANT CHANGE UP (ref 73–393)
LYMPHOCYTES # BLD AUTO: 2 K/UL — SIGNIFICANT CHANGE UP (ref 1–3.3)
LYMPHOCYTES # BLD AUTO: 29.8 % — SIGNIFICANT CHANGE UP (ref 13–44)
MAGNESIUM SERPL-MCNC: 1.8 MG/DL — SIGNIFICANT CHANGE UP (ref 1.6–2.6)
MCHC RBC-ENTMCNC: 28.6 PG — SIGNIFICANT CHANGE UP (ref 27–34)
MCHC RBC-ENTMCNC: 33.8 GM/DL — SIGNIFICANT CHANGE UP (ref 32–36)
MCV RBC AUTO: 84.5 FL — SIGNIFICANT CHANGE UP (ref 80–100)
MONOCYTES # BLD AUTO: 0.62 K/UL — SIGNIFICANT CHANGE UP (ref 0–0.9)
MONOCYTES NFR BLD AUTO: 9.2 % — SIGNIFICANT CHANGE UP (ref 2–14)
NEUTROPHILS # BLD AUTO: 3.97 K/UL — SIGNIFICANT CHANGE UP (ref 1.8–7.4)
NEUTROPHILS NFR BLD AUTO: 59.3 % — SIGNIFICANT CHANGE UP (ref 43–77)
NRBC # BLD: 0 /100 WBCS — SIGNIFICANT CHANGE UP (ref 0–0)
PLATELET # BLD AUTO: 222 K/UL — SIGNIFICANT CHANGE UP (ref 150–400)
POTASSIUM SERPL-MCNC: 3.7 MMOL/L — SIGNIFICANT CHANGE UP (ref 3.5–5.3)
POTASSIUM SERPL-SCNC: 3.7 MMOL/L — SIGNIFICANT CHANGE UP (ref 3.5–5.3)
PROT SERPL-MCNC: 8.2 G/DL — SIGNIFICANT CHANGE UP (ref 6–8.3)
RBC # BLD: 5.63 M/UL — SIGNIFICANT CHANGE UP (ref 4.2–5.8)
RBC # FLD: 13.1 % — SIGNIFICANT CHANGE UP (ref 10.3–14.5)
SARS-COV-2 RNA SPEC QL NAA+PROBE: SIGNIFICANT CHANGE UP
SODIUM SERPL-SCNC: 141 MMOL/L — SIGNIFICANT CHANGE UP (ref 135–145)
TROPONIN I, HIGH SENSITIVITY RESULT: 7.1 NG/L — SIGNIFICANT CHANGE UP
TROPONIN I, HIGH SENSITIVITY RESULT: 7.9 NG/L — SIGNIFICANT CHANGE UP
TSH SERPL-MCNC: 1.24 UIU/ML — SIGNIFICANT CHANGE UP (ref 0.36–3.74)
WBC # BLD: 6.71 K/UL — SIGNIFICANT CHANGE UP (ref 3.8–10.5)
WBC # FLD AUTO: 6.71 K/UL — SIGNIFICANT CHANGE UP (ref 3.8–10.5)

## 2022-09-14 PROCEDURE — 71045 X-RAY EXAM CHEST 1 VIEW: CPT | Mod: 26

## 2022-09-14 PROCEDURE — 99220: CPT

## 2022-09-14 PROCEDURE — 99222 1ST HOSP IP/OBS MODERATE 55: CPT

## 2022-09-14 PROCEDURE — 99053 MED SERV 10PM-8AM 24 HR FAC: CPT

## 2022-09-14 PROCEDURE — 93010 ELECTROCARDIOGRAM REPORT: CPT | Mod: 77

## 2022-09-14 PROCEDURE — 99285 EMERGENCY DEPT VISIT HI MDM: CPT

## 2022-09-14 PROCEDURE — 93010 ELECTROCARDIOGRAM REPORT: CPT

## 2022-09-14 RX ORDER — METOPROLOL TARTRATE 50 MG
5 TABLET ORAL ONCE
Refills: 0 | Status: COMPLETED | OUTPATIENT
Start: 2022-09-14 | End: 2022-09-14

## 2022-09-14 RX ORDER — NIFEDIPINE 30 MG
60 TABLET, EXTENDED RELEASE 24 HR ORAL DAILY
Refills: 0 | Status: DISCONTINUED | OUTPATIENT
Start: 2022-09-15 | End: 2022-09-17

## 2022-09-14 RX ORDER — NIFEDIPINE 30 MG
60 TABLET, EXTENDED RELEASE 24 HR ORAL ONCE
Refills: 0 | Status: COMPLETED | OUTPATIENT
Start: 2022-09-14 | End: 2022-09-14

## 2022-09-14 RX ORDER — SODIUM CHLORIDE 9 MG/ML
2000 INJECTION INTRAMUSCULAR; INTRAVENOUS; SUBCUTANEOUS ONCE
Refills: 0 | Status: COMPLETED | OUTPATIENT
Start: 2022-09-14 | End: 2022-09-14

## 2022-09-14 RX ORDER — HYDRALAZINE HCL 50 MG
7.5 TABLET ORAL ONCE
Refills: 0 | Status: COMPLETED | OUTPATIENT
Start: 2022-09-14 | End: 2022-09-14

## 2022-09-14 RX ORDER — ONDANSETRON 8 MG/1
4 TABLET, FILM COATED ORAL EVERY 8 HOURS
Refills: 0 | Status: DISCONTINUED | OUTPATIENT
Start: 2022-09-14 | End: 2022-09-17

## 2022-09-14 RX ORDER — ACETAMINOPHEN 500 MG
650 TABLET ORAL EVERY 6 HOURS
Refills: 0 | Status: DISCONTINUED | OUTPATIENT
Start: 2022-09-14 | End: 2022-09-17

## 2022-09-14 RX ORDER — HYDRALAZINE HCL 50 MG
25 TABLET ORAL ONCE
Refills: 0 | Status: COMPLETED | OUTPATIENT
Start: 2022-09-14 | End: 2022-09-14

## 2022-09-14 RX ORDER — LANOLIN ALCOHOL/MO/W.PET/CERES
3 CREAM (GRAM) TOPICAL AT BEDTIME
Refills: 0 | Status: DISCONTINUED | OUTPATIENT
Start: 2022-09-14 | End: 2022-09-17

## 2022-09-14 RX ADMIN — SODIUM CHLORIDE 2000 MILLILITER(S): 9 INJECTION INTRAMUSCULAR; INTRAVENOUS; SUBCUTANEOUS at 09:04

## 2022-09-14 RX ADMIN — Medication 25 MILLIGRAM(S): at 11:16

## 2022-09-14 RX ADMIN — Medication 5 MILLIGRAM(S): at 09:40

## 2022-09-14 RX ADMIN — Medication 7.5 MILLIGRAM(S): at 14:00

## 2022-09-14 RX ADMIN — Medication 60 MILLIGRAM(S): at 17:52

## 2022-09-14 NOTE — H&P ADULT - NSICDXFAMILYHX_GEN_ALL_CORE_FT
FAMILY HISTORY:  Mother  Still living? Unknown  FH: lupus, Age at diagnosis: Age Unknown    Sibling  Still living? Unknown  FH: CAD (coronary artery disease), Age at diagnosis: Age Unknown  FH: type 2 diabetes, Age at diagnosis: Age Unknown

## 2022-09-14 NOTE — H&P ADULT - NSHPPHYSICALEXAM_GEN_ALL_CORE
T(C): 36.1 (09-14-22 @ 07:41), Max: 36.1 (09-14-22 @ 07:41)  HR: 90 (09-14-22 @ 08:26) (90 - 148)  BP: 201/129 (09-14-22 @ 08:26) (201/129 - 243/118)  RR: 16 (09-14-22 @ 08:26) (16 - 16)  SpO2: 100% (09-14-22 @ 08:26) (92% - 100%)  Wt(kg): --Vital Signs Last 24 Hrs  T(C): 36.1 (14 Sep 2022 07:41), Max: 36.1 (14 Sep 2022 07:41)  T(F): 97 (14 Sep 2022 07:41), Max: 97 (14 Sep 2022 07:41)  HR: 90 (14 Sep 2022 08:26) (90 - 148)  BP: 201/129 (14 Sep 2022 08:26) (201/129 - 243/118)  BP(mean): 144 (14 Sep 2022 08:26) (144 - 144)  RR: 16 (14 Sep 2022 08:26) (16 - 16)  SpO2: 100% (14 Sep 2022 08:26) (92% - 100%)    Parameters below as of 14 Sep 2022 08:26  Patient On (Oxygen Delivery Method): room air        PHYSICAL EXAM:  GENERAL: NAD,   HEAD:  Atraumatic, Normocephalic  EYES: EOMI, PERRLA, conjunctiva and sclera clear  ENMT: No tonsillar erythema, exudates, or enlargement; Moist mucous membranes, Good dentition, No lesions  NECK: Supple, No JVD, Normal thyroid  NERVOUS SYSTEM:  Alert & Oriented X3, moving all extremities spontaneously  CHEST/LUNG: Clear to percussion bilaterally; No rales, rhonchi, wheezing, or rubs  HEART: Regular rate and rhythm; No murmurs, rubs, or gallops  ABDOMEN: Soft, Nontender, Nondistended; Bowel sounds present  EXTREMITIES:  2+ Peripheral Pulses, No clubbing, cyanosis, or edema  SKIN: No rashes or lesions

## 2022-09-14 NOTE — CONSULT NOTE ADULT - SUBJECTIVE AND OBJECTIVE BOX
CLIFF PAGE  [id number]      HPI:    Cliff Page is a 62 year old man, former cigarette smoker with past medical history of Hypertension and PVCs,       ALLERGIES:  No Known Allergies      PAST MEDICAL & SURGICAL HISTORY:  Hypertension            CURRENT MEDICATIONS:      SOCIAL HISTORY:      FAMILY HISTORY:      ROS:  All 10 systems reviewed and positives noted in HPI    OBJECTIVE:    VITAL SIGNS:  Vital Signs Last 24 Hrs  T(C): 36.1 (14 Sep 2022 07:41), Max: 36.1 (14 Sep 2022 07:41)  T(F): 97 (14 Sep 2022 07:41), Max: 97 (14 Sep 2022 07:41)  HR: 90 (14 Sep 2022 08:26) (90 - 148)  BP: 201/129 (14 Sep 2022 08:26) (201/129 - 243/118)  BP(mean): 144 (14 Sep 2022 08:26) (144 - 144)  RR: 16 (14 Sep 2022 08:26) (16 - 16)  SpO2: 100% (14 Sep 2022 08:26) (92% - 100%)    Parameters below as of 14 Sep 2022 08:26  Patient On (Oxygen Delivery Method): room air        PHYSICAL EXAM:  General: well appearing, no distress  HEENT: sclera anicteric  Neck: supple, no carotid bruits b/l  CVS: JVP ~ 7 cm H20, RRR, s1, s2, no murmurs/rubs/gallops  Chest: unlabored respirations, clear to auscultation b/l  Abdomen: non-distended  Extremities: no lower extremity edema b/l  Neuro: awake, alert & oriented x 3  Psych: normal affect      LABS:                        16.1   6.71  )-----------( 222      ( 14 Sep 2022 08:10 )             47.6     09-14    141  |  103  |  19  ----------------------------<  104<H>  3.7   |  28  |  1.33<H>    Ca    9.4      14 Sep 2022 08:10  Mg     1.8     09-14    TPro  8.2  /  Alb  4.6  /  TBili  1.3<H>  /  DBili  x   /  AST  20  /  ALT  15  /  AlkPhos  55  09-14            Outpatient nuclear stress test (2016):  Normal     Outpatient exercise treadmill stress test (12/2019):  No significant ST changes     Outpatient TTE (12/2019):  Normal LV and RV systolic function  No pericardial effusion            CLIFF PAGE  [id number]      HPI:    Cliff Page is a 62 year old man with past medical history of Hypertension and PVCs presents due to epigastric discomfort/belching and palpitations.    The patient reports that yesterday he ate sausage and peppers and started to have frequent belching. This morning he noticed persistent belching and also palpitations. He exercised and did not feel well. At rest he checked his pulse with a machine at home and reported that it was sporadic and also stated "arrhythmia" per patient. These symptoms prompted his presentation to the ER. Denies exertional chest pain or shortness of breath with his exercise regimens. Denies syncope. Denies alcohol use or drug use.      ALLERGIES:  No Known Allergies      PAST MEDICAL & SURGICAL HISTORY:  Hypertension  PVCs    SOCIAL HISTORY:  Smoked cigarettes in age 20s  Denies alcohol use  Denies illicit drug use    FAMILY HISTORY:  Brother with CAD diagnosed in age 50s    ROS:  All 10 systems reviewed and positives noted in HPI    OBJECTIVE:    VITAL SIGNS:  Vital Signs Last 24 Hrs  T(C): 36.1 (14 Sep 2022 07:41), Max: 36.1 (14 Sep 2022 07:41)  T(F): 97 (14 Sep 2022 07:41), Max: 97 (14 Sep 2022 07:41)  HR: 90 (14 Sep 2022 08:26) (90 - 148)  BP: 201/129 (14 Sep 2022 08:26) (201/129 - 243/118)  BP(mean): 144 (14 Sep 2022 08:26) (144 - 144)  RR: 16 (14 Sep 2022 08:26) (16 - 16)  SpO2: 100% (14 Sep 2022 08:26) (92% - 100%)    Parameters below as of 14 Sep 2022 08:26  Patient On (Oxygen Delivery Method): room air        PHYSICAL EXAM:  General: middle aged man, mildly anxious   HEENT: sclera anicteric  Neck: supple  CVS: JVP ~ 7 cm H20, RRR, s1, s2, no murmurs/rubs  Chest: unlabored respirations, clear to auscultation b/l  Abdomen: non-distended  Extremities: no lower extremity edema b/l  Neuro: awake, alert & oriented x 3  Psych: normal affect      LABS:                        16.1   6.71  )-----------( 222      ( 14 Sep 2022 08:10 )             47.6     09-14    141  |  103  |  19  ----------------------------<  104<H>  3.7   |  28  |  1.33<H>    Ca    9.4      14 Sep 2022 08:10  Mg     1.8     09-14    TPro  8.2  /  Alb  4.6  /  TBili  1.3<H>  /  DBili  x   /  AST  20  /  ALT  15  /  AlkPhos  55  09-14            Outpatient nuclear stress test (2016):  Normal     Outpatient exercise treadmill stress test (12/2019):  No significant ST changes     Outpatient TTE (12/2019):  Normal LV and RV systolic function  No pericardial effusion     ECG (9/14/22): sinus tachycardia, first degree AV block, inferior infarct (more prominent than prior ECGs)

## 2022-09-14 NOTE — ED ADULT NURSE NOTE - NS ED NURSE PATIENT LEFT UNIT TIME
Include Location In Plan?: Yes Detail Level: Generalized Hide Include Location In Plan Question?: No 14:57

## 2022-09-14 NOTE — H&P ADULT - NSHPLABSRESULTS_GEN_ALL_CORE
LABS:                        16.1   6.71  )-----------( 222      ( 14 Sep 2022 08:10 )             47.6     09-14    141  |  103  |  19  ----------------------------<  104<H>  3.7   |  28  |  1.33<H>    Ca    9.4      14 Sep 2022 08:10  Mg     1.8     09-14    TPro  8.2  /  Alb  4.6  /  TBili  1.3<H>  /  DBili  x   /  AST  20  /  ALT  15  /  AlkPhos  55  09-14         CAPILLARY BLOOD GLUCOSE      RADIOLOGY & ADDITIONAL TESTS:    CXR reviewed by me. negative    Consultant(s) Notes Reviewed:  [x ] YES  [ ] NO  Care Discussed with Consultants/Other Providers [ x] YES  [ ] NO Dr. Jorgensen  Imaging Personally Reviewed:  [ ] YES  [ ] NO

## 2022-09-14 NOTE — ED ADULT NURSE NOTE - OBJECTIVE STATEMENT
Assumed pt care fro a 62 yr old male alert and oriented x4 complaining of palpitations. Pt reports he has been feeling palpitations and gassy since yesterday. Pt states he has a recent diet change, however denies any new medications, drug or alcohol. Pt denies any dizziness, chest pain or dyspnea. Pt noted to be hypertensive. MD aware. Pt denies any neurological symptoms.

## 2022-09-14 NOTE — ED PROVIDER NOTE - CLINICAL SUMMARY MEDICAL DECISION MAKING FREE TEXT BOX
62M presents to the ED c/o unwell feeling upon awakening this morning. Increased belching and nauseated feeling. He says he felt like he was gassy. He had breakfast and then went to work out/exercise. 10 minutes in, he had onset of palpitations. He checked his pulse and it was "jumping around" between 70s and went as high as 130s. He presented to the ED where in triage, his BP was 220/120 and his HR was 140. Pt was very anxious, he says. Patient denies chest pain. He has a known arrhythmia, APCs. He sees Dr Alvarez. He had echo/stress about 3 years ago. He was on losartan but since taking on a healthy diet and lifestyle has stopped it (losartan 50/12.5mg).   No fever. No diaphorsis. No addt'l complaints. Exam as stated. Will monitor. Dimer neg. Trop x 2 stable. D/W Dr Kaur as card consult.   Recc observation. Pt okay with plan.

## 2022-09-14 NOTE — H&P ADULT - HISTORY OF PRESENT ILLNESS
62M with history of HTN (off medications) presents to the ED c/o unwell feeling upon awakening this morning. Pt with increased belching and nauseated feeling. He reports feeling of palpitations when he was working out and transient epigastric tenderness with a certain yoga moves. He checked his pulse and it was "jumping around" and the monitor showed "arrythmia". Pt denies headache, dizziness, diaphoresis, numbness/tingling, fever, chills, Denies chest pain, sob, nausea/vomiting, dysuria.   He presented to the ED where in triage, his BP was 220/120 and his HR was 140. Pt states that he used to take anti-hypertensive medications but has been off of medications for one year since he lost weight and change his diet.     EKG showed sinus rhythm with 1st degree AV block with PVCs, Q waves in inferior leads.  In ED, afebrile, HR 90, /129, 100% on RA.  62M with history of HTN (off medications) presents to the ED c/o unwell feeling upon awakening this morning. Pt with increased belching and nauseated feeling. He reports feeling of palpitations when he was working out and transient epigastric tenderness with a certain yoga moves. He checked his pulse and it was "jumping around" and the monitor showed "arrythmia". Pt denies headache, dizziness, diaphoresis, numbness/tingling, fever, chills. Denies chest pain, sob, nausea/vomiting, dysuria.   He presented to the ED where in triage, his BP was 220/120 and his HR was 140. Pt states that he used to take anti-hypertensive medications but has been off of medications for one year since he lost weight and changed his diet.     EKG showed sinus rhythm with 1st degree AV block with PVCs, Q waves in inferior leads.  In ED, afebrile, HR 90, /129, 100% on RA.

## 2022-09-14 NOTE — CONSULT NOTE ADULT - ASSESSMENT
Assessment:  Cliff Sun is a 62 year old man, former cigarette smoker with past medical history of Hypertension and PVCs presents with palpitations.      ECG consistent with sinus tachycardia and old inferior infarct which was present previously, slightly more prominent now.  Assessment:  Cliff Sun is a 62 year old man with past medical history of Hypertension and PVCs presents due to epigastric discomfort/belching and palpitations, found to have hypertensive urgency.     The patient reports that yesterday he ate sausage and peppers and started to have frequent belching. This morning had persistent belching/epigastric discomfort, also had palpitations and was told by a handheld home monitor that he had an "arrhythmia." ECG consistent with sinus tachycardia and old inferior infarct pattern more prominent than prior ECGs. Troponins negative x 2. D-Dimer negative.     Recommendations:  [] Hypertensive urgency: Patient previously was on Losartan/HCTZ 50-12.5 mg daily as outpatient but after COVID infection in 2020, BP became low and this was discontinued. Now with hypertensive urgency, due to borderline elevated Cr would hold ACE-I for now and trial Nifedipine 60 mg daily, receiving Hydralazine in ER.   [] Palpitations: TSH normal. Due to patient reporting "arrhythmia" on home BP/HR monitor, would check echocardiogram to evaluate for structural heart disease. Continue to monitor on telemetry, may be due to PVCs.   [] Abnormal EKG: Plan for nuclear stress test in AM, please keep NPO after MN tonight    The patient should be admitted to Observation unit. Discussed with Dr. Jorgensen. Will sign out to cardiology team to follow along tomorrow.    Colt Kaur MD  Cardiology

## 2022-09-14 NOTE — H&P ADULT - ASSESSMENT
62M with history of HTN (off medications) presents to the ED c/o unwell feeling upon awakening this morning. Pt with increased belching and nauseated feeling. Pt reports having palpitations. Pt found with hypertensive urgency and EKG showed PVCs and Q waves on inferior leads.    #palpitations  #frequent PVCs  #Hypertensive urgency  - pt used to take losartan and HCTZ in the past   - check TSH  - check ECHO  - seen by cardiology, nuclear stress test in AM  - IV hydralazine x1 and monitor BP   - avoid over reduction of BP, will aim for 25% reduction each day  - nifedipine 60mg and HCTZ 12.5mg   - nuclear stress test in AM    # CKD 2  - Baseline Cr 1.29  - renal at baseline      dvt ppx: low risk, encourage ambulation  62M with history of HTN (off medications) presents to the ED c/o unwell feeling upon awakening this morning. Pt with increased belching and nauseated feeling. Pt reports having palpitations. Pt found with hypertensive urgency and EKG showed PVCs and Q waves on inferior leads.    #palpitations  #frequent PVCs  #Hypertensive urgency  - pt used to take losartan and HCTZ in the past   - check TSH  - check ECHO  - seen by cardiology, nuclear stress test in AM  - IV hydralazine x1 and monitor BP   - nifedipine 60mg and HCTZ 12.5mg   - nuclear stress test in AM    # CKD 2  - Baseline Cr 1.29  - renal at baseline      dvt ppx: low risk, encourage ambulation  62M with history of HTN (off medications) presents to the ED c/o unwell feeling upon awakening this morning. Pt with increased belching and nauseated feeling. Pt reports having palpitations. Pt found with hypertensive urgency and EKG showed PVCs and Q waves on inferior leads.    #Palpitations  #Frequent PVCs  #Hypertensive urgency  - pt used to take losartan and HCTZ in the past   - check TSH  - check ECHO  - trend troponin  - seen by cardiology, nuclear stress test in AM  - IV hydralazine x1 and monitor BP   - nifedipine 60mg and HCTZ 12.5mg   - nuclear stress test in AM    # CKD 2  - Baseline Cr 1.29  - renal at baseline    dvt ppx: low risk, encourage ambulation  62M with history of HTN (off medications) presents to the ED c/o unwell feeling upon awakening this morning. Pt with increased belching and nauseated feeling. Pt reports having palpitations. Pt found with hypertensive urgency and EKG showed PVCs and Q waves on inferior leads.    #Palpitations  #Frequent PVCs  #Hypertensive urgency  - pt used to take losartan and HCTZ in the past   - check TSH  - check ECHO  - trend troponin  - seen by cardiology, nuclear stress test in AM  - IV hydralazine x1 and monitor BP   - start nifedipine 60mg   - nuclear stress test in AM    # CKD 2  - Baseline Cr 1.29  - renal at baseline    dvt ppx: low risk, encourage ambulation

## 2022-09-15 ENCOUNTER — TRANSCRIPTION ENCOUNTER (OUTPATIENT)
Age: 62
End: 2022-09-15

## 2022-09-15 VITALS
RESPIRATION RATE: 16 BRPM | DIASTOLIC BLOOD PRESSURE: 83 MMHG | OXYGEN SATURATION: 97 % | TEMPERATURE: 99 F | SYSTOLIC BLOOD PRESSURE: 132 MMHG | HEART RATE: 101 BPM

## 2022-09-15 LAB
A1C WITH ESTIMATED AVERAGE GLUCOSE RESULT: 5.4 % — SIGNIFICANT CHANGE UP (ref 4–5.6)
ANION GAP SERPL CALC-SCNC: 7 MMOL/L — SIGNIFICANT CHANGE UP (ref 5–17)
BASOPHILS # BLD AUTO: 0.03 K/UL — SIGNIFICANT CHANGE UP (ref 0–0.2)
BASOPHILS NFR BLD AUTO: 0.4 % — SIGNIFICANT CHANGE UP (ref 0–2)
BUN SERPL-MCNC: 29 MG/DL — HIGH (ref 7–23)
CALCIUM SERPL-MCNC: 9.5 MG/DL — SIGNIFICANT CHANGE UP (ref 8.4–10.5)
CHLORIDE SERPL-SCNC: 104 MMOL/L — SIGNIFICANT CHANGE UP (ref 96–108)
CO2 SERPL-SCNC: 29 MMOL/L — SIGNIFICANT CHANGE UP (ref 22–31)
CREAT SERPL-MCNC: 1.24 MG/DL — SIGNIFICANT CHANGE UP (ref 0.5–1.3)
EGFR: 66 ML/MIN/1.73M2 — SIGNIFICANT CHANGE UP
EOSINOPHIL # BLD AUTO: 0.05 K/UL — SIGNIFICANT CHANGE UP (ref 0–0.5)
EOSINOPHIL NFR BLD AUTO: 0.6 % — SIGNIFICANT CHANGE UP (ref 0–6)
ESTIMATED AVERAGE GLUCOSE: 108 MG/DL — SIGNIFICANT CHANGE UP (ref 68–114)
GLUCOSE SERPL-MCNC: 93 MG/DL — SIGNIFICANT CHANGE UP (ref 70–99)
HCT VFR BLD CALC: 49.3 % — SIGNIFICANT CHANGE UP (ref 39–50)
HCV AB S/CO SERPL IA: 0.12 S/CO — SIGNIFICANT CHANGE UP (ref 0–0.99)
HCV AB SERPL-IMP: SIGNIFICANT CHANGE UP
HGB BLD-MCNC: 16 G/DL — SIGNIFICANT CHANGE UP (ref 13–17)
IMM GRANULOCYTES NFR BLD AUTO: 0.4 % — SIGNIFICANT CHANGE UP (ref 0–1.5)
LYMPHOCYTES # BLD AUTO: 1.27 K/UL — SIGNIFICANT CHANGE UP (ref 1–3.3)
LYMPHOCYTES # BLD AUTO: 15.5 % — SIGNIFICANT CHANGE UP (ref 13–44)
MCHC RBC-ENTMCNC: 27.6 PG — SIGNIFICANT CHANGE UP (ref 27–34)
MCHC RBC-ENTMCNC: 32.5 GM/DL — SIGNIFICANT CHANGE UP (ref 32–36)
MCV RBC AUTO: 85 FL — SIGNIFICANT CHANGE UP (ref 80–100)
MONOCYTES # BLD AUTO: 0.58 K/UL — SIGNIFICANT CHANGE UP (ref 0–0.9)
MONOCYTES NFR BLD AUTO: 7.1 % — SIGNIFICANT CHANGE UP (ref 2–14)
NEUTROPHILS # BLD AUTO: 6.24 K/UL — SIGNIFICANT CHANGE UP (ref 1.8–7.4)
NEUTROPHILS NFR BLD AUTO: 76 % — SIGNIFICANT CHANGE UP (ref 43–77)
NRBC # BLD: 0 /100 WBCS — SIGNIFICANT CHANGE UP (ref 0–0)
PLATELET # BLD AUTO: 226 K/UL — SIGNIFICANT CHANGE UP (ref 150–400)
POTASSIUM SERPL-MCNC: 3.9 MMOL/L — SIGNIFICANT CHANGE UP (ref 3.5–5.3)
POTASSIUM SERPL-SCNC: 3.9 MMOL/L — SIGNIFICANT CHANGE UP (ref 3.5–5.3)
RBC # BLD: 5.8 M/UL — SIGNIFICANT CHANGE UP (ref 4.2–5.8)
RBC # FLD: 13.5 % — SIGNIFICANT CHANGE UP (ref 10.3–14.5)
SODIUM SERPL-SCNC: 140 MMOL/L — SIGNIFICANT CHANGE UP (ref 135–145)
WBC # BLD: 8.2 K/UL — SIGNIFICANT CHANGE UP (ref 3.8–10.5)
WBC # FLD AUTO: 8.2 K/UL — SIGNIFICANT CHANGE UP (ref 3.8–10.5)

## 2022-09-15 PROCEDURE — 99225: CPT

## 2022-09-15 PROCEDURE — 78452 HT MUSCLE IMAGE SPECT MULT: CPT | Mod: 26,MG

## 2022-09-15 PROCEDURE — 36415 COLL VENOUS BLD VENIPUNCTURE: CPT

## 2022-09-15 PROCEDURE — 93005 ELECTROCARDIOGRAM TRACING: CPT

## 2022-09-15 PROCEDURE — 84443 ASSAY THYROID STIM HORMONE: CPT

## 2022-09-15 PROCEDURE — 83036 HEMOGLOBIN GLYCOSYLATED A1C: CPT

## 2022-09-15 PROCEDURE — 87635 SARS-COV-2 COVID-19 AMP PRB: CPT

## 2022-09-15 PROCEDURE — G1004: CPT

## 2022-09-15 PROCEDURE — 93016 CV STRESS TEST SUPVJ ONLY: CPT

## 2022-09-15 PROCEDURE — 96374 THER/PROPH/DIAG INJ IV PUSH: CPT | Mod: XU

## 2022-09-15 PROCEDURE — 71045 X-RAY EXAM CHEST 1 VIEW: CPT

## 2022-09-15 PROCEDURE — 99217: CPT

## 2022-09-15 PROCEDURE — 99285 EMERGENCY DEPT VISIT HI MDM: CPT | Mod: 25

## 2022-09-15 PROCEDURE — G0378: CPT

## 2022-09-15 PROCEDURE — 93017 CV STRESS TEST TRACING ONLY: CPT

## 2022-09-15 PROCEDURE — 80053 COMPREHEN METABOLIC PANEL: CPT

## 2022-09-15 PROCEDURE — 93306 TTE W/DOPPLER COMPLETE: CPT

## 2022-09-15 PROCEDURE — 78452 HT MUSCLE IMAGE SPECT MULT: CPT | Mod: MG

## 2022-09-15 PROCEDURE — 96375 TX/PRO/DX INJ NEW DRUG ADDON: CPT | Mod: XU

## 2022-09-15 PROCEDURE — 93306 TTE W/DOPPLER COMPLETE: CPT | Mod: 26

## 2022-09-15 PROCEDURE — 85025 COMPLETE CBC W/AUTO DIFF WBC: CPT

## 2022-09-15 PROCEDURE — 86803 HEPATITIS C AB TEST: CPT

## 2022-09-15 PROCEDURE — 83735 ASSAY OF MAGNESIUM: CPT

## 2022-09-15 PROCEDURE — 93018 CV STRESS TEST I&R ONLY: CPT

## 2022-09-15 PROCEDURE — 80048 BASIC METABOLIC PNL TOTAL CA: CPT

## 2022-09-15 PROCEDURE — 84484 ASSAY OF TROPONIN QUANT: CPT

## 2022-09-15 PROCEDURE — 85379 FIBRIN DEGRADATION QUANT: CPT

## 2022-09-15 PROCEDURE — 83690 ASSAY OF LIPASE: CPT

## 2022-09-15 RX ORDER — LOSARTAN POTASSIUM 100 MG/1
1 TABLET, FILM COATED ORAL
Qty: 0 | Refills: 0 | DISCHARGE

## 2022-09-15 RX ORDER — NIFEDIPINE 30 MG
1 TABLET, EXTENDED RELEASE 24 HR ORAL
Qty: 30 | Refills: 0
Start: 2022-09-15 | End: 2022-10-14

## 2022-09-15 RX ADMIN — Medication 60 MILLIGRAM(S): at 05:59

## 2022-09-15 NOTE — PROGRESS NOTE ADULT - ASSESSMENT
62 year old man admitted with palpitations and hypertensive urgency.  EKGs suggested possible inferior wall MI  He has been asymptomatic since admission and cardiac troponins are negative.  BP is stable.    Plan  - follow up nuclear stress test results  - await echo  - continue nifedipine extended release 60 mg daily  - he should follow up with primary cardiologist Dr. Alvarez post discharge    discussed with patient and with primary team     
62 male with PMH HTN (off meds) presents to ED with feeling unwell, recently ate sausage and peppers and salad the day prior, presented with hypertensive urgency and palpitations also with ECG suggestive of old inferior infarct.      # Palpitations  # Frequent PVCs  # HTN Urgency  Tn negative, ECHO normal, NST normal  Cardiology appreciated  started nifedipine extended release 60 daily  cleared from cardiac perspective  plan to follow up outpatient with Dr. Alvarez    # CKD 2  baseline Cr 1.29  renal at baseline    # DVT ppx  low risk, encourage ambulation    # Dispo  discharge home, cleared by cardiology and hospital medicine

## 2022-09-15 NOTE — DISCHARGE NOTE PROVIDER - HOSPITAL COURSE
Hospital Course  62 male with PMH HTN (off meds) and PVCs presented with epigastric discomfort, belching and palpitations.  Denies exertional chest pain or SOB.  PT was admitted to the hospital, found to have hypertensive urgency in ED, systolic 's.  Pt was seen by cardiology, ECG c/w sinus tachycardia and old inferior infarct pattern.  Tn were negative, D Dimer was WNL.  Pt was started on nifedipine extended release 60 daily (was previously on losartan/hctz).  TSH was normal, ECHO was done and had normal EF, no significant valvular heart disease.  PT underwent a nuclear stress test which was also normal.  Pt has been asymptomatic since admission, BP has remained stable.  Cleared by cardiology and hospital medicine for outpatient follow up with primary cardiologist Dr. Alvarez.     Palliative Care / Advanced Care Planning  Code Status: Full Code  Patient/Family agreeable to Hospice/Palliative (Y/N)?  Summary of Goals of Care Conversation:    Discharging Provider:  Enrrique Kingston NP  Contact Info: Cell [phone] - Please call with any questions or concerns.    Outpatient Provider: Dr. Smith PCP - aware, Dr. Alvarez - Cardiologist

## 2022-09-15 NOTE — PROGRESS NOTE ADULT - SUBJECTIVE AND OBJECTIVE BOX
SUBJ:  Patient is a 62y old  Male who presents with a chief complaint of palpitations (14 Sep 2022 13:24)  patient seen and examined in stress lab  case discussed with Dr. Kaur  patient feeling well... no chest pain or palpitations        PAST MEDICAL & SURGICAL HISTORY:  Hypertension      No significant past surgical history          MEDICATIONS  (STANDING):  NIFEdipine XL 60 milliGRAM(s) Oral daily    MEDICATIONS  (PRN):  acetaminophen     Tablet .. 650 milliGRAM(s) Oral every 6 hours PRN Temp greater or equal to 38C (100.4F), Mild Pain (1 - 3)  aluminum hydroxide/magnesium hydroxide/simethicone Suspension 30 milliLiter(s) Oral every 4 hours PRN Dyspepsia  melatonin 3 milliGRAM(s) Oral at bedtime PRN Insomnia  ondansetron Injectable 4 milliGRAM(s) IV Push every 8 hours PRN Nausea and/or Vomiting          Vital Signs Last 24 Hrs  T(C): 37 (15 Sep 2022 05:38), Max: 37 (15 Sep 2022 05:38)  T(F): 98.6 (15 Sep 2022 05:38), Max: 98.6 (15 Sep 2022 05:38)  HR: 101 (15 Sep 2022 05:38) (76 - 101)  BP: 132/83 (15 Sep 2022 05:38) (132/83 - 156/92)  BP(mean): --  RR: 16 (15 Sep 2022 05:38) (16 - 18)  SpO2: 97% (15 Sep 2022 05:38) (97% - 100%)    Parameters below as of 15 Sep 2022 05:38  Patient On (Oxygen Delivery Method): room air        REVIEW OF SYSTEMS:  CONSTITUTIONAL: No fever, weight loss, or fatigue  RESPIRATORY: No cough, wheezing, chills or hemoptysis; No shortness of breath  CARDIOVASCULAR: No chest pain or chest pressure.  No shortness of breath or dyspnea on exertion.  No palpitations, dizziness, light headedness, syncope or near syncope.  No edema, no orthopnea.   NEUROLOGICAL: No headaches, memory loss, loss of strength, numbness, or tremors      PHYSICAL EXAM  Constitutional:  WDWN. No acute distress  HEENT: normocephalic, atraumatic.  PERRLA. EOMI  Neck : No JVD. no carotid bruits  Lungs:  clear to auscultation bilaterally. no rhonchi. no wheezing  Heart:  S1 and S2. No S3, S4. I/VI systolic murmur.  Abdomen:  soft, non tender.  Extremities: No clubbing, cyanoisis or edema  Nuerologic:  A+O x 3. No focal deficits  Skin:  no rashes        LABS:                        16.0   8.20  )-----------( 226      ( 15 Sep 2022 07:58 )             49.3     09-15    140  |  104  |  29<H>  ----------------------------<  93  3.9   |  29  |  1.24    Ca    9.5      15 Sep 2022 07:58  Mg     1.8     09-14    TPro  8.2  /  Alb  4.6  /  TBili  1.3<H>  /  DBili  x   /  AST  20  /  ALT  15  /  AlkPhos  55  09-14    I&O's Summary    14 Sep 2022 07:01  -  15 Sep 2022 07:00  --------------------------------------------------------  IN: 960 mL / OUT: 0 mL / NET: 960 mL    < from: 12 Lead ECG (09.14.22 @ 13:50) >  Diagnosis Line Sinus rhythm with 1st degree AV block with premature atrial complexes with aberrant conduction  Inferior infarct (cited on or before 16-NOV-2019)  Abnormal ECG  When compared with ECG of 14-SEP-2022 07:42,  aberrant conduction is now present    < end of copied text >                      
Patient is a 62y old  Male who presents with a chief complaint of palpitations (15 Sep 2022 10:09)    Patient seen and examined at bedside.  no acute events overnight    ALLERGIES:  No Known Allergies        Vital Signs Last 24 Hrs  T(F): 98.6 (15 Sep 2022 05:38), Max: 98.6 (15 Sep 2022 05:38)  HR: 101 (15 Sep 2022 05:38) (76 - 101)  BP: 132/83 (15 Sep 2022 05:38) (132/83 - 156/92)  RR: 16 (15 Sep 2022 05:38) (16 - 18)  SpO2: 97% (15 Sep 2022 05:38) (97% - 100%)  I&O's Summary    14 Sep 2022 07:01  -  15 Sep 2022 07:00  --------------------------------------------------------  IN: 960 mL / OUT: 0 mL / NET: 960 mL      MEDICATIONS:  acetaminophen     Tablet .. 650 milliGRAM(s) Oral every 6 hours PRN  aluminum hydroxide/magnesium hydroxide/simethicone Suspension 30 milliLiter(s) Oral every 4 hours PRN  melatonin 3 milliGRAM(s) Oral at bedtime PRN  NIFEdipine XL 60 milliGRAM(s) Oral daily  ondansetron Injectable 4 milliGRAM(s) IV Push every 8 hours PRN      PHYSICAL EXAM:  General: NAD, A/O x 3  ENT: MMM, no oral thrush  Neck: Supple, No JVD  Lungs: Clear to auscultation bilaterally, non labored breathing  Cardio: RRR, S1/S2, No murmurs  Abdomen: Soft, Nontender, Nondistended; Bowel sounds present  Extremities: No cyanosis, No edema    LABS:                        16.0   8.20  )-----------( 226      ( 15 Sep 2022 07:58 )             49.3     09-15    140  |  104  |  29  ----------------------------<  93  3.9   |  29  |  1.24    Ca    9.5      15 Sep 2022 07:58  Mg     1.8     09-14    TPro  8.2  /  Alb  4.6  /  TBili  1.3  /  DBili  x   /  AST  20  /  ALT  15  /  AlkPhos  55  09-14          CARDIAC MARKERS ( 14 Sep 2022 09:32 )  x     / 7.9 ng/L / x     / x     / x      CARDIAC MARKERS ( 14 Sep 2022 08:10 )  x     / 7.1 ng/L / x     / x     / x      < from: NM Nuclear Stress Multiple (09.15.22 @ 11:41) >  Impression:  Normal cardiac perfusion rest and stress study    --- End of Report ---            BLAKE MCKEON MD; Attending Cardiologist  This document has been electronically signed. Sep 15 2022 12:18PM    < end of copied text >        TSH 1.236   TSH with FT4 reflex --  Total T3 --        COVID-19 PCR: NotDetec (09-14-22 @ 11:00)      RADIOLOGY & ADDITIONAL TESTS:      < from: TTE Echo Complete w/o Contrast w/ Doppler (09.15.22 @ 10:34) >  mmary:   1. Left ventricular ejection fraction, by visual estimation, is 60 to   65%.   2. Normal global left ventricular systolic function.   3. Normal right ventricular size and function.   4. Normal left atrial size.   5. Normal right atrial size.   6. Thickening of the anterior and posterior mitral valve leaflets.   7. Trace mitral valve regurgitation.   8. The Dimesionless Index value is .7.    [id number] Blake Mckeon MD, [facility] , Electronically signed on   9/15/2022 at 11:58:11 AM            *** Final ***    < end of copied text >    Care Discussed with Consultants/Other Providers:     Dr. Mckeon Cardiology

## 2022-09-15 NOTE — PROGRESS NOTE ADULT - NS ATTEND AMEND GEN_ALL_CORE FT
Pt seen and examined at bedside.  No acute events overnight  Pt denies cp, palpitations, sob, abd pain, N/V, fever, chills    Pt w/ frequent PVCs + Hypertensive urgency   Discussed case with Cardiology, Dr. Mckeon; s/p stress test without any acute concerns  Continue Nifedipine  Outpatient follow up

## 2022-09-15 NOTE — DISCHARGE NOTE PROVIDER - CARE PROVIDER_API CALL
Noemí Smith  [facility] MEDICINE  [address] [place] [address], [address]  [place] [address]  Phone: [phone]  Fax: [phone]  Follow Up Time:     Sh[facility]  [address], [address]  [place] [address]  Phone: [phone]  Fax: [phone]  Follow Up Time:

## 2022-09-15 NOTE — DISCHARGE NOTE NURSING/CASE MANAGEMENT/SOCIAL WORK - NSDCPEFALRISK_GEN_ALL_CORE
For information on Fall & Injury Prevention, visit: [email or web address] OR  [email or web address] OR  https://www.cdc.gov/steadi/patient.html

## 2022-09-15 NOTE — DISCHARGE NOTE PROVIDER - NSDCMRMEDTOKEN_GEN_ALL_CORE_FT
Flomax 0.4 mg oral capsule: 1 cap(s) orally once a day    mg oral tablet: 1 tab(s) orally every 6 hours   NIFEdipine (Eqv-Adalat CC) 60 mg oral tablet, extended release: 1 tab(s) orally once a day

## 2022-09-15 NOTE — DISCHARGE NOTE PROVIDER - ATTENDING DISCHARGE PHYSICAL EXAMINATION:
GENERAL: NAD Well developed male  HEAD: AT, NC  NECK: Supple no JVD  HEART: RRR S1S2  LUNGS: CTA B/L No wheezing nonlabored breathing  ABD: Soft NT, ND, + BS  NEURO: AAOx3

## 2022-09-15 NOTE — DISCHARGE NOTE PROVIDER - NSDCCPCAREPLAN_GEN_ALL_CORE_FT
PRINCIPAL DISCHARGE DIAGNOSIS  Diagnosis: Palpitations  Assessment and Plan of Treatment: you were admitted to the hospital with palpitations and high blood pressure.  a cardiologist evalualuted saw you.  you had a normal echocardiogram and nuclear stress test.  you are cleared for discharge.  please follow up with dr. fernandez.      SECONDARY DISCHARGE DIAGNOSES  Diagnosis: Accelerated hypertension  Assessment and Plan of Treatment: you were started on a new medication called nifedipine extended release 60 mg daily

## 2022-09-15 NOTE — DISCHARGE NOTE NURSING/CASE MANAGEMENT/SOCIAL WORK - PATIENT PORTAL LINK FT
You can access the FollowMyHealth Patient Portal offered by [facility] by registering at the following website: [email or web address]. By joining [employer]’s FollowMyHealth portal, you will also be able to view your health information using other applications (apps) compatible with our system.

## 2022-10-17 ENCOUNTER — NON-APPOINTMENT (OUTPATIENT)
Age: 62
End: 2022-10-17

## 2022-10-17 ENCOUNTER — APPOINTMENT (OUTPATIENT)
Dept: CARDIOLOGY | Facility: CLINIC | Age: 62
End: 2022-10-17

## 2022-10-17 VITALS — DIASTOLIC BLOOD PRESSURE: 101 MMHG | OXYGEN SATURATION: 100 % | SYSTOLIC BLOOD PRESSURE: 179 MMHG | HEART RATE: 107 BPM

## 2022-10-17 VITALS
HEIGHT: 69 IN | SYSTOLIC BLOOD PRESSURE: 197 MMHG | HEART RATE: 121 BPM | OXYGEN SATURATION: 98 % | WEIGHT: 156 LBS | BODY MASS INDEX: 23.11 KG/M2 | DIASTOLIC BLOOD PRESSURE: 111 MMHG

## 2022-10-17 VITALS — DIASTOLIC BLOOD PRESSURE: 100 MMHG | SYSTOLIC BLOOD PRESSURE: 210 MMHG

## 2022-10-17 PROCEDURE — 93000 ELECTROCARDIOGRAM COMPLETE: CPT

## 2022-10-17 PROCEDURE — 99215 OFFICE O/P EST HI 40 MIN: CPT

## 2022-10-17 NOTE — DISCUSSION/SUMMARY
[PVCs] : ectopic ventricular beats [Outpatient Evaluation] : outpatient evaluation [Ambulatory BP Monitoring] : ambulatory blood pressure monitoring [Stable] : stable [None] : There are no changes in medication management [Essential Hypertension] : essential hypertension [Not Responding to Treatment] : not responding to treatment [Medication Changes Per Orders] : Medication changes are as documented in orders [Minutes Spent: ___] : for [unfilled] ~Uminutes [de-identified] : white coat syndrome- his machine correlates in office in past, BP better at home [de-identified] : inc losarten to 100/25, didn’t tolerate nifedipine [FreeTextEntry2] : reviewed hosp records, prior tests, last seen 2019

## 2022-10-17 NOTE — REASON FOR VISIT
[Follow-Up - Clinic] : a clinic follow-up of [Abnormal ECG] : an abnormal ECG [Hypertension] : hypertension [FreeTextEntry2] : last seen 2019, s/p recent hosp for palps in setting of GI issues had signif htn after self d/cing meds, says BP better on home machine, neg echo, nuclear stress in hosp [FreeTextEntry1] : no sob or sscp

## 2022-10-17 NOTE — PHYSICAL EXAM
[General Appearance - Well Developed] : well developed [Normal Appearance] : normal appearance [Well Groomed] : well groomed [General Appearance - Well Nourished] : well nourished [No Deformities] : no deformities [General Appearance - In No Acute Distress] : no acute distress [Normal Conjunctiva] : the conjunctiva exhibited no abnormalities [Eyelids - No Xanthelasma] : the eyelids demonstrated no xanthelasmas [Normal Oral Mucosa] : normal oral mucosa [No Oral Pallor] : no oral pallor [No Oral Cyanosis] : no oral cyanosis [Normal Jugular Venous A Waves Present] : normal jugular venous A waves present [Normal Jugular Venous V Waves Present] : normal jugular venous V waves present [No Jugular Venous Galvan A Waves] : no jugular venous galvan A waves [Respiration, Rhythm And Depth] : normal respiratory rhythm and effort [Exaggerated Use Of Accessory Muscles For Inspiration] : no accessory muscle use [Auscultation Breath Sounds / Voice Sounds] : lungs were clear to auscultation bilaterally [Abdomen Soft] : soft [Abdomen Tenderness] : non-tender [Abdomen Mass (___ Cm)] : no abdominal mass palpated [Abnormal Walk] : normal gait [Gait - Sufficient For Exercise Testing] : the gait was sufficient for exercise testing [Nail Clubbing] : no clubbing of the fingernails [Cyanosis, Localized] : no localized cyanosis [Petechial Hemorrhages (___cm)] : no petechial hemorrhages [Skin Color & Pigmentation] : normal skin color and pigmentation [] : no rash [No Venous Stasis] : no venous stasis [Skin Lesions] : no skin lesions [No Skin Ulcers] : no skin ulcer [No Xanthoma] : no  xanthoma was observed [Affect] : the affect was normal [Oriented To Time, Place, And Person] : oriented to person, place, and time [Mood] : the mood was normal [No Anxiety] : not feeling anxious [Normal Rate] : normal [Normal S1] : normal S1 [Normal S2] : normal S2 [No Murmur] : no murmurs heard [2+] : left 2+ [No Abnormalities] : the abdominal aorta was not enlarged and no bruit was heard [No Pitting Edema] : no pitting edema present [S3] : no S3 [S4] : no S4 [Right Carotid Bruit] : no bruit heard over the right carotid [Left Carotid Bruit] : no bruit heard over the left carotid [Right Femoral Bruit] : no bruit heard over the right femoral artery [Left Femoral Bruit] : no bruit heard over the left femoral artery

## 2022-10-24 ENCOUNTER — APPOINTMENT (OUTPATIENT)
Dept: CARDIOLOGY | Facility: CLINIC | Age: 62
End: 2022-10-24

## 2022-10-24 VITALS — DIASTOLIC BLOOD PRESSURE: 105 MMHG | OXYGEN SATURATION: 100 % | SYSTOLIC BLOOD PRESSURE: 210 MMHG | HEART RATE: 109 BPM

## 2022-10-24 PROCEDURE — 99211 OFF/OP EST MAY X REQ PHY/QHP: CPT

## 2023-02-21 NOTE — ED PROVIDER NOTE - CPE EDP EYES NORM
Patient has the following symptoms: nausea, slight abd pain, fever  The symptoms have been present for 2 days  Patient was not offered an appointment.  Transferred to RN     normal...

## 2023-04-20 ENCOUNTER — APPOINTMENT (OUTPATIENT)
Dept: CARDIOLOGY | Facility: CLINIC | Age: 63
End: 2023-04-20

## 2023-12-10 ENCOUNTER — RX RENEWAL (OUTPATIENT)
Age: 63
End: 2023-12-10

## 2023-12-10 RX ORDER — LOSARTAN POTASSIUM AND HYDROCHLOROTHIAZIDE 25; 100 MG/1; MG/1
100-25 TABLET ORAL DAILY
Qty: 90 | Refills: 0 | Status: ACTIVE | COMMUNITY
Start: 2019-12-12 | End: 1900-01-01

## 2024-10-07 NOTE — ED ADULT NURSE NOTE - SUICIDE SCREENING QUESTION 2
Dr. Corey notified of BP. Verbal order to bolus pt NS.  
ED to inpatient nurses report      Chief Complaint:  Chief Complaint   Patient presents with    Fever    Sweats    Urinary Frequency     Present to ED from: home    MOA:     LOC: alert and orientated to name, place, date  Mobility: Requires assistance * 1  Oxygen Baseline: room air    Current needs required: room air     Code Status:   Prior    What abnormal results were found and what did you give/do to treat them? Acute pyelonephritis - see mar      Mental Status:  Level of Consciousness: Alert (0)    Psych Assessment:        Vitals:  Patient Vitals for the past 24 hrs:   BP Temp Temp src Pulse Resp SpO2 Weight   10/07/24 0157 (!) 110/56 -- -- 90 18 100 % --   10/07/24 0134 (!) 108/58 -- -- 87 20 96 % --   10/07/24 0107 (!) 112/53 -- -- 89 30 98 % --   10/07/24 0037 (!) 112/54 -- -- 91 (!) 31 97 % --   10/06/24 2332 (!) 93/54 -- -- 91 17 96 % --   10/06/24 2320 (!) 95/58 99.1 °F (37.3 °C) Oral 94 22 92 % --   10/06/24 2150 -- -- -- (!) 112 13 96 % --   10/06/24 2137 118/69 (!) 102.6 °F (39.2 °C) Tympanic (!) 120 22 94 % 104.3 kg (230 lb)        LDAs:   Peripheral IV 10/06/24 Distal;Left Cephalic (Active)       Ambulatory Status:  No data recorded    Diagnosis:  DISPOSITION Admitted 10/07/2024 01:50:11 AM   Final diagnoses:   Sepsis with encephalopathy without septic shock, due to unspecified organism (HCC)   Acute pyelonephritis        Consults:  None     Pain Score:  Pain Assessment  Pain Assessment: None - Denies Pain    C-SSRS:   Risk of Suicide: No Risk    Sepsis Screening:       [place] Fall Risk:       Swallow Screening        Preferred Language:   English      ALLERGIES     Seasonal    SURGICAL HISTORY       Past Surgical History:   Procedure Laterality Date    ANKLE SURGERY      Left    ANKLE SURGERY      ANOMALOUS PULMONARY VENOUS RETURN REPAIR, 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 
POCT 156  
Spoke to  staff who approved patient transfer to [facility]. Patient transported upstairs in stable condition.  
No

## 2024-11-13 NOTE — ED ADULT NURSE NOTE - ALCOHOL PRE SCREEN (AUDIT - C)
Patient oriented to room and ED throughput process.  Safety measures with ED bed locked in lowest position and call light in reach.  Patient educated on all orders, including any medications.  Patient educated on chief complaint/symptoms. Patient encouraged to ask questions regarding care, medications or treatment plan.  Patient aware of how to reach staff with questions/concerns.    
Statement Selected